# Patient Record
Sex: MALE | Race: BLACK OR AFRICAN AMERICAN | NOT HISPANIC OR LATINO | Employment: OTHER | URBAN - METROPOLITAN AREA
[De-identification: names, ages, dates, MRNs, and addresses within clinical notes are randomized per-mention and may not be internally consistent; named-entity substitution may affect disease eponyms.]

---

## 2019-03-08 ENCOUNTER — TRANSCRIBE ORDERS (OUTPATIENT)
Dept: CARDIOLOGY | Facility: CLINIC | Age: 84
End: 2019-03-08

## 2019-03-08 ENCOUNTER — OFFICE VISIT (OUTPATIENT)
Dept: CARDIOLOGY | Facility: CLINIC | Age: 84
End: 2019-03-08

## 2019-03-08 ENCOUNTER — LAB (OUTPATIENT)
Dept: LAB | Facility: HOSPITAL | Age: 84
End: 2019-03-08

## 2019-03-08 VITALS
BODY MASS INDEX: 28.63 KG/M2 | HEIGHT: 73 IN | WEIGHT: 216 LBS | DIASTOLIC BLOOD PRESSURE: 80 MMHG | HEART RATE: 83 BPM | SYSTOLIC BLOOD PRESSURE: 132 MMHG

## 2019-03-08 DIAGNOSIS — I25.5 ISCHEMIC CARDIOMYOPATHY: ICD-10-CM

## 2019-03-08 DIAGNOSIS — Z13.6 SCREENING FOR CARDIOVASCULAR CONDITION: Primary | ICD-10-CM

## 2019-03-08 DIAGNOSIS — Z01.810 PREPROCEDURAL CARDIOVASCULAR EXAMINATION: ICD-10-CM

## 2019-03-08 DIAGNOSIS — I25.5 ISCHEMIC CARDIOMYOPATHY: Primary | ICD-10-CM

## 2019-03-08 DIAGNOSIS — Z13.6 SCREENING FOR CARDIOVASCULAR CONDITION: ICD-10-CM

## 2019-03-08 LAB
ANION GAP SERPL CALCULATED.3IONS-SCNC: 12.9 MMOL/L
BASOPHILS # BLD AUTO: 0.02 10*3/MM3 (ref 0–0.2)
BASOPHILS NFR BLD AUTO: 0.3 % (ref 0–1.5)
BUN BLD-MCNC: 19 MG/DL (ref 8–23)
BUN/CREAT SERPL: 15.2 (ref 7–25)
CALCIUM SPEC-SCNC: 10.2 MG/DL (ref 8.6–10.5)
CHLORIDE SERPL-SCNC: 106 MMOL/L (ref 98–107)
CO2 SERPL-SCNC: 24.1 MMOL/L (ref 22–29)
CREAT BLD-MCNC: 1.25 MG/DL (ref 0.76–1.27)
DEPRECATED RDW RBC AUTO: 50.4 FL (ref 37–54)
EOSINOPHIL # BLD AUTO: 0.1 10*3/MM3 (ref 0–0.4)
EOSINOPHIL NFR BLD AUTO: 1.7 % (ref 0.3–6.2)
ERYTHROCYTE [DISTWIDTH] IN BLOOD BY AUTOMATED COUNT: 15.3 % (ref 12.3–15.4)
GFR SERPL CREATININE-BSD FRML MDRD: 67 ML/MIN/1.73
GLUCOSE BLD-MCNC: 107 MG/DL (ref 65–99)
HCT VFR BLD AUTO: 49.8 % (ref 37.5–51)
HGB BLD-MCNC: 16.2 G/DL (ref 13–17.7)
IMM GRANULOCYTES # BLD AUTO: 0.01 10*3/MM3 (ref 0–0.05)
IMM GRANULOCYTES NFR BLD AUTO: 0.2 % (ref 0–0.5)
LYMPHOCYTES # BLD AUTO: 1.65 10*3/MM3 (ref 0.7–3.1)
LYMPHOCYTES NFR BLD AUTO: 28.3 % (ref 19.6–45.3)
MCH RBC QN AUTO: 29.2 PG (ref 26.6–33)
MCHC RBC AUTO-ENTMCNC: 32.5 G/DL (ref 31.5–35.7)
MCV RBC AUTO: 89.9 FL (ref 79–97)
MONOCYTES # BLD AUTO: 0.71 10*3/MM3 (ref 0.1–0.9)
MONOCYTES NFR BLD AUTO: 12.2 % (ref 5–12)
NEUTROPHILS # BLD AUTO: 3.34 10*3/MM3 (ref 1.4–7)
NEUTROPHILS NFR BLD AUTO: 57.3 % (ref 42.7–76)
NRBC BLD AUTO-RTO: 0 /100 WBC (ref 0–0)
PLATELET # BLD AUTO: 191 10*3/MM3 (ref 140–450)
PMV BLD AUTO: 10.6 FL (ref 6–12)
POTASSIUM BLD-SCNC: 4.1 MMOL/L (ref 3.5–5.2)
RBC # BLD AUTO: 5.54 10*6/MM3 (ref 4.14–5.8)
SODIUM BLD-SCNC: 143 MMOL/L (ref 136–145)
WBC NRBC COR # BLD: 5.83 10*3/MM3 (ref 3.4–10.8)

## 2019-03-08 PROCEDURE — 99204 OFFICE O/P NEW MOD 45 MIN: CPT | Performed by: INTERNAL MEDICINE

## 2019-03-08 PROCEDURE — 85025 COMPLETE CBC W/AUTO DIFF WBC: CPT

## 2019-03-08 PROCEDURE — 93000 ELECTROCARDIOGRAM COMPLETE: CPT | Performed by: INTERNAL MEDICINE

## 2019-03-08 PROCEDURE — 80048 BASIC METABOLIC PNL TOTAL CA: CPT

## 2019-03-08 PROCEDURE — 36415 COLL VENOUS BLD VENIPUNCTURE: CPT

## 2019-03-08 RX ORDER — METOPROLOL SUCCINATE 25 MG/1
12.5 TABLET, EXTENDED RELEASE ORAL DAILY
Qty: 90 TABLET | Refills: 3 | Status: SHIPPED | OUTPATIENT
Start: 2019-03-08 | End: 2020-01-14

## 2019-03-08 RX ORDER — COLCHICINE 0.6 MG/1
0.6 TABLET ORAL DAILY
COMMUNITY
End: 2019-03-13 | Stop reason: HOSPADM

## 2019-03-08 RX ORDER — SERTRALINE HYDROCHLORIDE 25 MG/1
25 TABLET, FILM COATED ORAL DAILY
Refills: 6 | COMMUNITY
Start: 2019-02-25

## 2019-03-08 NOTE — PROGRESS NOTES
Subjective:     Encounter Date:03/08/2019      Patient ID: Cookie Larsen is a 83 y.o. male.    Chief Complaint:  83-year-old man with a past medical history of hypertension, deafness with cochlear implants, vitamin D deficiency who presented to his primary care physician with complaints of 6 months of dyspnea on exertion, and some chest pain, particularly while climbing stairs.  His wife states that he has had the symptoms for about 6 months and they have been progressively worsening.  He notes some heaviness in the chest and dyspnea walking on flat surfaces for a long time and with climbing the stairs in his house.  He has no presyncope or syncope.  He denies any history of arrhythmias or cardiac testing.  He underwent stress testing at [place] which showed a moderate area of anterior ischemia.  Additionally an echocardiogram showed mildly reduced systolic function with an EF of 47%  He is a former smoker.  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  He has a cochlear device that helps him here otherwise he is almost deaf.  He has 5 children and is accompanied by his wife they are so cute.        The following portions of the patient's history were reviewed and updated as appropriate: allergies, current medications, past family history, past medical history, past social history, past surgical history and problem list.    Past Medical History:   Diagnosis Date   • GONZALES (dyspnea on exertion)    • Glaucoma    • Gout    • Hypertension    • IFG (impaired fasting glucose)    • Vitamin D deficiency        No family history on file.    Social History     Socioeconomic History   • Marital status:      Spouse name: Not on file   • Number of children: Not on file   • Years of education: Not on file   • Highest education level: Not on file   Tobacco Use   • Smoking status: Never Smoker   • Smokeless tobacco: Never Used   Substance and Sexual Activity   • Alcohol use: Yes     Comment: one daily    • Drug  use: No       No Known Allergies    No past surgical history on file.    Review of Systems   Cardiovascular: Positive for chest pain and dyspnea on exertion. Negative for irregular heartbeat, leg swelling, near-syncope, orthopnea, palpitations, paroxysmal nocturnal dyspnea and syncope.   Respiratory: Positive for shortness of breath, sleep disturbances due to breathing and snoring.    Neurological: Positive for excessive daytime sleepiness. Negative for dizziness.         ECG 12 Lead  Date/Time: 3/8/2019 12:31 PM  Performed by: Jovita Crews MD  Authorized by: Jovita Crews MD   Previous ECG: no previous ECG available  Rhythm: sinus rhythm  Ectopy: atrial premature contractions  Rate: normal  QRS axis: normal  Other findings: left ventricular hypertrophy with strain    Clinical impression: abnormal EKG               Objective:     Physical Exam  GEN: no distress, alert and oriented  Lungs CTAB, no rales or wheezes  Chest: no abnormalities  Heart: RRR, no murmurs  Abdo: soft,  Nontender, nondistended  Extr: no edema, +2 DP and 2+ carotid pulses b/l  Skin: no rash or bruising  Psych: organized thought, normal behavior and affect    Lab Review:         Assessment:          Diagnosis Plan   1. Ischemic cardiomyopathy  Case Request Cath Lab: Coronary angiography, Left heart catheterization, Left ventricular angiography          Plan:       This is a very sweet 83-year-old man who presents for evaluation of new cardiac myopathy as well as abnormal nuclear stress test.  I advised him that I think he needs a left heart catheterization as I am concerned he has an LAD lesion.  He and his wife appear to understand the risks and benefits that we discussed including bleeding, heart attack stroke arrhythmia and death.  I will start metoprolol metoprolol 12.5 mg daily and will see him next week for his procedure.     Dr. Hogan, thank you for referring this kind patient to me.    Jovita Crews MD  03/08/19

## 2019-03-08 NOTE — H&P (VIEW-ONLY)
Subjective:     Encounter Date:03/08/2019      Patient ID: Cookie Larsen is a 83 y.o. male.    Chief Complaint:  83-year-old man with a past medical history of hypertension, deafness with cochlear implants, vitamin D deficiency who presented to his primary care physician with complaints of 6 months of dyspnea on exertion, and some chest pain, particularly while climbing stairs.  His wife states that he has had the symptoms for about 6 months and they have been progressively worsening.  He notes some heaviness in the chest and dyspnea walking on flat surfaces for a long time and with climbing the stairs in his house.  He has no presyncope or syncope.  He denies any history of arrhythmias or cardiac testing.  He underwent stress testing at [place] which showed a moderate area of anterior ischemia.  Additionally an echocardiogram showed mildly reduced systolic function with an EF of 47%  He is a former smoker.  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  He has a cochlear device that helps him here otherwise he is almost deaf.  He has 5 children and is accompanied by his wife they are so cute.        The following portions of the patient's history were reviewed and updated as appropriate: allergies, current medications, past family history, past medical history, past social history, past surgical history and problem list.    Past Medical History:   Diagnosis Date   • GONZALES (dyspnea on exertion)    • Glaucoma    • Gout    • Hypertension    • IFG (impaired fasting glucose)    • Vitamin D deficiency        No family history on file.    Social History     Socioeconomic History   • Marital status:      Spouse name: Not on file   • Number of children: Not on file   • Years of education: Not on file   • Highest education level: Not on file   Tobacco Use   • Smoking status: Never Smoker   • Smokeless tobacco: Never Used   Substance and Sexual Activity   • Alcohol use: Yes     Comment: one daily    • Drug  use: No       No Known Allergies    No past surgical history on file.    Review of Systems   Cardiovascular: Positive for chest pain and dyspnea on exertion. Negative for irregular heartbeat, leg swelling, near-syncope, orthopnea, palpitations, paroxysmal nocturnal dyspnea and syncope.   Respiratory: Positive for shortness of breath, sleep disturbances due to breathing and snoring.    Neurological: Positive for excessive daytime sleepiness. Negative for dizziness.         ECG 12 Lead  Date/Time: 3/8/2019 12:31 PM  Performed by: Jovita Crews MD  Authorized by: Jovita Crews MD   Previous ECG: no previous ECG available  Rhythm: sinus rhythm  Ectopy: atrial premature contractions  Rate: normal  QRS axis: normal  Other findings: left ventricular hypertrophy with strain    Clinical impression: abnormal EKG               Objective:     Physical Exam  GEN: no distress, alert and oriented  Lungs CTAB, no rales or wheezes  Chest: no abnormalities  Heart: RRR, no murmurs  Abdo: soft,  Nontender, nondistended  Extr: no edema, +2 DP and 2+ carotid pulses b/l  Skin: no rash or bruising  Psych: organized thought, normal behavior and affect    Lab Review:         Assessment:          Diagnosis Plan   1. Ischemic cardiomyopathy  Case Request Cath Lab: Coronary angiography, Left heart catheterization, Left ventricular angiography          Plan:       This is a very sweet 83-year-old man who presents for evaluation of new cardiac myopathy as well as abnormal nuclear stress test.  I advised him that I think he needs a left heart catheterization as I am concerned he has an LAD lesion.  He and his wife appear to understand the risks and benefits that we discussed including bleeding, heart attack stroke arrhythmia and death.  I will start metoprolol metoprolol 12.5 mg daily and will see him next week for his procedure.     Dr. Hogan, thank you for referring this kind patient to me.    Jovita Crews MD  03/08/19

## 2019-03-11 PROBLEM — I25.5 ISCHEMIC CARDIOMYOPATHY: Status: ACTIVE | Noted: 2019-03-11

## 2019-03-13 ENCOUNTER — HOSPITAL ENCOUNTER (OUTPATIENT)
Facility: HOSPITAL | Age: 84
Setting detail: HOSPITAL OUTPATIENT SURGERY
Discharge: HOME | End: 2019-03-13
Attending: INTERNAL MEDICINE | Admitting: INTERNAL MEDICINE

## 2019-03-13 VITALS
DIASTOLIC BLOOD PRESSURE: 84 MMHG | SYSTOLIC BLOOD PRESSURE: 134 MMHG | HEART RATE: 77 BPM | BODY MASS INDEX: 28.89 KG/M2 | WEIGHT: 218 LBS | RESPIRATION RATE: 18 BRPM | TEMPERATURE: 97.7 F | HEIGHT: 73 IN | OXYGEN SATURATION: 97 %

## 2019-03-13 DIAGNOSIS — I25.5 ISCHEMIC CARDIOMYOPATHY: ICD-10-CM

## 2019-03-13 PROCEDURE — 93458 L HRT ARTERY/VENTRICLE ANGIO: CPT | Performed by: INTERNAL MEDICINE

## 2019-03-13 PROCEDURE — C1769 GUIDE WIRE: HCPCS | Performed by: INTERNAL MEDICINE

## 2019-03-13 PROCEDURE — 99152 MOD SED SAME PHYS/QHP 5/>YRS: CPT | Performed by: INTERNAL MEDICINE

## 2019-03-13 PROCEDURE — 99153 MOD SED SAME PHYS/QHP EA: CPT | Performed by: INTERNAL MEDICINE

## 2019-03-13 PROCEDURE — 25010000002 HEPARIN (PORCINE) PER 1000 UNITS: Performed by: INTERNAL MEDICINE

## 2019-03-13 PROCEDURE — 25010000002 FENTANYL CITRATE (PF) 100 MCG/2ML SOLUTION: Performed by: INTERNAL MEDICINE

## 2019-03-13 PROCEDURE — 0 IOPAMIDOL PER 1 ML: Performed by: INTERNAL MEDICINE

## 2019-03-13 PROCEDURE — C1894 INTRO/SHEATH, NON-LASER: HCPCS | Performed by: INTERNAL MEDICINE

## 2019-03-13 PROCEDURE — 25010000002 MIDAZOLAM PER 1 MG: Performed by: INTERNAL MEDICINE

## 2019-03-13 RX ORDER — MIDAZOLAM HYDROCHLORIDE 1 MG/ML
INJECTION INTRAMUSCULAR; INTRAVENOUS AS NEEDED
Status: DISCONTINUED | OUTPATIENT
Start: 2019-03-13 | End: 2019-03-13 | Stop reason: HOSPADM

## 2019-03-13 RX ORDER — SODIUM CHLORIDE 9 MG/ML
100 INJECTION, SOLUTION INTRAVENOUS CONTINUOUS
Status: DISCONTINUED | OUTPATIENT
Start: 2019-03-13 | End: 2019-03-13 | Stop reason: HOSPADM

## 2019-03-13 RX ORDER — FENTANYL CITRATE 50 UG/ML
INJECTION, SOLUTION INTRAMUSCULAR; INTRAVENOUS AS NEEDED
Status: DISCONTINUED | OUTPATIENT
Start: 2019-03-13 | End: 2019-03-13 | Stop reason: HOSPADM

## 2019-03-13 RX ORDER — SODIUM CHLORIDE 0.9 % (FLUSH) 0.9 %
3-10 SYRINGE (ML) INJECTION AS NEEDED
Status: DISCONTINUED | OUTPATIENT
Start: 2019-03-13 | End: 2019-03-13 | Stop reason: HOSPADM

## 2019-03-13 RX ORDER — LIDOCAINE HYDROCHLORIDE 10 MG/ML
0.1 INJECTION, SOLUTION EPIDURAL; INFILTRATION; INTRACAUDAL; PERINEURAL ONCE AS NEEDED
Status: DISCONTINUED | OUTPATIENT
Start: 2019-03-13 | End: 2019-03-13 | Stop reason: HOSPADM

## 2019-03-13 RX ORDER — LIDOCAINE HYDROCHLORIDE 20 MG/ML
INJECTION, SOLUTION INFILTRATION; PERINEURAL AS NEEDED
Status: DISCONTINUED | OUTPATIENT
Start: 2019-03-13 | End: 2019-03-13 | Stop reason: HOSPADM

## 2019-03-13 RX ORDER — SODIUM CHLORIDE 0.9 % (FLUSH) 0.9 %
3 SYRINGE (ML) INJECTION EVERY 12 HOURS SCHEDULED
Status: DISCONTINUED | OUTPATIENT
Start: 2019-03-13 | End: 2019-03-13 | Stop reason: HOSPADM

## 2019-03-13 RX ORDER — SODIUM CHLORIDE 9 MG/ML
75 INJECTION, SOLUTION INTRAVENOUS CONTINUOUS
Status: DISCONTINUED | OUTPATIENT
Start: 2019-03-13 | End: 2019-03-13 | Stop reason: HOSPADM

## 2019-03-13 RX ADMIN — SODIUM CHLORIDE 75 ML/HR: 9 INJECTION, SOLUTION INTRAVENOUS at 09:18

## 2019-03-13 NOTE — DISCHARGE INSTRUCTIONS
[facility]  [address] [place] [address]    Coronary Angiogram (Radial/Ulnar Approach) After Care    Refer to this sheet in the next few weeks. These instructions provide you with information on caring for yourself after your procedure. Your caregiver may also give you more specific instructions. Your treatment has been planned according to current medical practices, but problems sometimes occur. Call your caregiver if you have any problems or questions after your procedure.    Home Care Instructions:  · You may shower the day after the procedure. Remove the bandage (dressing) and gently wash the site with plain soap and water. Gently pat the site dry. You may apply a band aid daily for 2 days if desired.    · Do not apply powder or lotion to the site.  · Do not submerge the affected site in water for 3 to 5 days or until the site is completely healed.   · Do not lift, push or pull anything over 10 pounds for 2 days after your procedure.  · Inspect the site at least twice daily. You may notice some bruising at the site and it may be tender for 1 to 2 weeks.     · Increase your fluid intake for the next 2 days.    · Keep arm elevated for 24 hours. For the remainder of the day, keep your arm in “Pledge of Allegiance” position when up and about.     · You may drive 24 hours after the procedure unless otherwise instructed by your caregiver.  · Do not operate machinery or power tools for 24 hours.  · A responsible adult should be with you for the first 24 hours after you arrive home. Do not make any important legal decisions or sign legal papers for 24 hours.  Do not drink alcohol for 24 hours.    · Metformin or any medications containing Metformin should not be taken for 48 hours after your procedure.      Call Your Doctor if:   · You have unusual pain at the radial/ulnar (wrist) site.  · You have redness, warmth, swelling, or pain at the radial/ulnar (wrist) site.  · You have drainage (other  than a small amount of blood on the dressing).  · You have chills or a fever > 101.  · Your arm becomes pale or dark, cool, tingly, or numb.  · You have heavy bleeding from the site, hold pressure on the site for 20 minutes.  If the bleeding stops, apply a fresh bandage and call your cardiologist.  However, if you continue to have bleeding, call 911.                Moderate Conscious Sedation, Adult, Care After  Refer to this sheet in the next few weeks. These instructions provide you with information on caring for yourself after your procedure. Your health care provider may also give you more specific instructions. Your treatment has been planned according to current medical practices, but problems sometimes occur. Call your health care provider if you have any problems or questions after your procedure.  WHAT TO EXPECT AFTER THE PROCEDURE   After your procedure:  · You may feel sleepy, clumsy, and have poor balance for several hours.  · Vomiting may occur if you eat too soon after the procedure.  HOME CARE INSTRUCTIONS  · Do not participate in any activities where you could become injured for at least 24 hours. Do not:    Drive.    Swim.    Ride a bicycle.    Operate heavy machinery.    Cook.    Use power tools.    Climb ladders.    Work from a high place.  · Do not make important decisions or sign legal documents until you are improved.  · If you vomit, drink water, juice, or soup when you can drink without vomiting. Make sure you have little or no nausea before eating solid foods.  · Only take over-the-counter or prescription medicines for pain, discomfort, or fever as directed by your health care provider.  · Make sure you and your family fully understand everything about the medicines given to you, including what side effects may occur.  · You should not drink alcohol, take sleeping pills, or take medicines that cause drowsiness for at least 24 hours.  · If you smoke, do not smoke without supervision.  · If  you are feeling better, you may resume normal activities 24 hours after you were sedated.  · Keep all appointments with your health care provider.  · You should have a responsible adult stay with you for the first 24 hours post procedure.  SEEK MEDICAL CARE IF:  · Your skin is pale or bluish in color.  · You continue to feel nauseous or vomit.  · Your pain is getting worse and is not helped by medicine.  · You have bleeding or swelling.  · You are still sleepy or feeling clumsy after 24 hours.  SEEK IMMEDIATE MEDICAL CARE IF:  · You develop a rash.  · You have difficulty breathing.  · You develop any type of allergic problem.  · You have a fever.  MAKE SURE YOU:  · Understand these instructions.  · Will watch your condition.  · Will get help right away if you are not doing well or get worse.     This information is not intended to replace advice given to you by your health care provider. Make sure you discuss any questions you have with your health care provider.     Document Released: 10/08/2014 Document Revised: 01/08/2016 Document Reviewed: 10/08/2014  [employer] Interactive Patient Education ©2016 [employer]

## 2019-04-01 ENCOUNTER — OFFICE VISIT (OUTPATIENT)
Dept: CARDIOLOGY | Facility: CLINIC | Age: 84
End: 2019-04-01

## 2019-04-01 VITALS
WEIGHT: 216 LBS | DIASTOLIC BLOOD PRESSURE: 82 MMHG | SYSTOLIC BLOOD PRESSURE: 134 MMHG | HEART RATE: 70 BPM | BODY MASS INDEX: 28.63 KG/M2 | HEIGHT: 73 IN

## 2019-04-01 DIAGNOSIS — I25.5 ISCHEMIC CARDIOMYOPATHY: Primary | ICD-10-CM

## 2019-04-01 PROCEDURE — 99213 OFFICE O/P EST LOW 20 MIN: CPT | Performed by: INTERNAL MEDICINE

## 2019-04-01 PROCEDURE — 93000 ELECTROCARDIOGRAM COMPLETE: CPT | Performed by: INTERNAL MEDICINE

## 2019-04-01 RX ORDER — ROSUVASTATIN CALCIUM 5 MG/1
TABLET, COATED ORAL
COMMUNITY
End: 2020-07-21

## 2019-04-01 NOTE — PROGRESS NOTES
Subjective:     Encounter Date: 04/01/19        Patient ID: Cookie Larsen is a 83 y.o. male.    Chief Complaint:  83-year-old man with a past medical history of hypertension, deafness with cochlear implants, vitamin D deficiency who originally presented with a complaint of 6 months of dyspnea on exertion and intermittent chest pain with climbing stairs.  He underwent sstress testing at [place] which showed a moderate area of anterior ischemia.  Additionally an echocardiogram showed mildly reduced systolic function with an EF of 47%  He is a former smoker.  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  He has a cochlear device that helps him here otherwise he is almost deaf. He has 5 children and is accompanied by his wife they have been  since 1962.   He underwent left heart catheterization with me on March 11, 2019.  He had normal LV filling pressures, EF of 40-45%.  Coronary angiogram showed mild disease in the LAD with a diffusely diseased small caliber first diagonal.  The circumflex and RCA are normal.      Cardiomyopathy   Pertinent negatives include no chest pain.       The following portions of the patient's history were reviewed and updated as appropriate: allergies, current medications, past family history, past medical history, past social history, past surgical history and problem list.    Past Medical History:   Diagnosis Date   • GONZALES (dyspnea on exertion)    • Glaucoma    • Gout    • Hypertension    • IFG (impaired fasting glucose)    • Vitamin D deficiency        No family history on file.    Social History     Socioeconomic History   • Marital status:      Spouse name: Not on file   • Number of children: Not on file   • Years of education: Not on file   • Highest education level: Not on file   Tobacco Use   • Smoking status: Never Smoker   • Smokeless tobacco: Never Used   Substance and Sexual Activity   • Alcohol use: Yes     Comment: 1-2 daily    • Drug use: No   • Sexual  activity: Defer       No Known Allergies    Past Surgical History:   Procedure Laterality Date   • CARDIAC CATHETERIZATION N/A 3/13/2019    Procedure: Coronary angiography;  Surgeon: Jovita Crews MD;  Location: [facility] CATH INVASIVE LOCATION;  Service: Cardiology   • CARDIAC CATHETERIZATION N/A 3/13/2019    Procedure: Left Heart Cath;  Surgeon: Jovita Crews MD;  Location: [facility] CATH INVASIVE LOCATION;  Service: Cardiology   • CARDIAC CATHETERIZATION N/A 3/13/2019    Procedure: Left ventriculography;  Surgeon: Jovita Crews MD;  Location: [facility] CATH INVASIVE LOCATION;  Service: Cardiology   • EAR MASTOIDECTOMY W/ COCHLEAR IMPLANT W/ LANDMARK Right    • HEMORRHOIDECTOMY         Review of Systems   Cardiovascular: Positive for dyspnea on exertion. Negative for chest pain, irregular heartbeat, leg swelling, near-syncope, palpitations, paroxysmal nocturnal dyspnea and syncope.   Respiratory: Positive for sleep disturbances due to breathing and snoring.    Neurological: Positive for excessive daytime sleepiness. Negative for dizziness.         ECG 12 Lead  Date/Time: 4/1/2019 12:53 PM  Performed by: Jovita Crews MD  Authorized by: Jovita Crews MD                  Objective:     Physical Exam  GEN: no distress, alert and oriented  Lungs CTAB, no rales or wheezes  Chest: no abnormalities  Heart: RRR, no murmurs  Abdo: soft,  Nontender, nondistended  Extr: no edema, +2 DP and 2+ carotid pulses b/l  Skin: no rash or bruising  Psych: organized thought, normal behavior and affect    Lab Review:         Assessment:          Diagnosis Plan   1. Ischemic cardiomyopathy            Plan:         Heart Failure  Assessment  • NYHA class II - There is slight limitation of physical activity. The patient is comfortable at rest, but physical activity results in fatigue, palpitations or shortness of breath.  • Left ventricular function is mildly reduced by qualitative assessment  • Increase metoprolol from 12.5 to 25 daily- well  tolerated, continue irbesartan 150.  No clinical signs of volume overload- will hold off on diuretic        Follow up in 3 months     Dr. Hogan, thank you for referring this kind patient to me.    Jovita Crews MD  04/01/19

## 2019-07-09 ENCOUNTER — TELEPHONE (OUTPATIENT)
Dept: CARDIAC REHAB | Facility: HOSPITAL | Age: 84
End: 2019-07-09

## 2019-07-09 ENCOUNTER — OFFICE VISIT (OUTPATIENT)
Dept: CARDIOLOGY | Facility: CLINIC | Age: 84
End: 2019-07-09

## 2019-07-09 VITALS
BODY MASS INDEX: 29.03 KG/M2 | DIASTOLIC BLOOD PRESSURE: 80 MMHG | WEIGHT: 219 LBS | SYSTOLIC BLOOD PRESSURE: 136 MMHG | HEIGHT: 73 IN | HEART RATE: 78 BPM

## 2019-07-09 DIAGNOSIS — I42.9 CARDIOMYOPATHY, UNSPECIFIED TYPE (HCC): Primary | ICD-10-CM

## 2019-07-09 PROCEDURE — 93000 ELECTROCARDIOGRAM COMPLETE: CPT | Performed by: INTERNAL MEDICINE

## 2019-07-09 PROCEDURE — 99213 OFFICE O/P EST LOW 20 MIN: CPT | Performed by: INTERNAL MEDICINE

## 2019-07-09 NOTE — PROGRESS NOTES
Subjective:     Encounter Date: 07/09/19        Patient ID: Cookie Larsen is a 84 y.o. male.    Chief Complaint:  83-year-old man with a past medical history of hypertension, deafness with cochlear implants, vitamin D deficiency who originally presented with a complaint of 6 months of dyspnea on exertion and intermittent chest pain with climbing stairs.  He underwent sstress testing at [place] which showed a moderate area of anterior ischemia.  Additionally an echocardiogram showed mildly reduced systolic function with an EF of 47%  He is a former smoker.  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  He has a cochlear device that helps him here otherwise he is almost deaf. He has 5 children and is accompanied by his wife they have been  since 1962.   He underwent left heart catheterization with me on March 11, 2019.  He had normal LV filling pressures, EF of 40-45%.  Coronary angiogram showed mild disease in the LAD with a diffusely diseased small caliber first diagonal.  The circumflex and RCA are normal.    Today he returns for follow up. He did not bring a medication list. He states he's taking his medications as I have prescribed. He is very concerned about his neighbors, who may be running a drug ring?, and therefore he is not walking for exercise and stays mostly confined at home. He denies chest pain or dyspnea, but does continue to feel weak and fatigued.       Cardiomyopathy   Associated symptoms include coughing. Pertinent negatives include no chest pain.       The following portions of the patient's history were reviewed and updated as appropriate: allergies, current medications, past family history, past medical history, past social history, past surgical history and problem list.    Past Medical History:   Diagnosis Date   • GONZALES (dyspnea on exertion)    • Glaucoma    • Gout    • Hypertension    • IFG (impaired fasting glucose)    • Vitamin D deficiency        No family history on  file.    Social History     Socioeconomic History   • Marital status:      Spouse name: Not on file   • Number of children: Not on file   • Years of education: Not on file   • Highest education level: Not on file   Tobacco Use   • Smoking status: Never Smoker   • Smokeless tobacco: Never Used   Substance and Sexual Activity   • Alcohol use: Yes     Comment: 1-2 daily    • Drug use: No   • Sexual activity: Defer       No Known Allergies    Past Surgical History:   Procedure Laterality Date   • CARDIAC CATHETERIZATION N/A 3/13/2019    Procedure: Coronary angiography;  Surgeon: Jovita Crews MD;  Location:  [facility] CATH INVASIVE LOCATION;  Service: Cardiology   • CARDIAC CATHETERIZATION N/A 3/13/2019    Procedure: Left Heart Cath;  Surgeon: Jovita Crews MD;  Location:  [facility] CATH INVASIVE LOCATION;  Service: Cardiology   • CARDIAC CATHETERIZATION N/A 3/13/2019    Procedure: Left ventriculography;  Surgeon: Jovita Crews MD;  Location:  [facility] CATH INVASIVE LOCATION;  Service: Cardiology   • EAR MASTOIDECTOMY W/ COCHLEAR IMPLANT W/ LANDMARK Right    • HEMORRHOIDECTOMY         Review of Systems   Constitution: Negative.   HENT: Positive for ear pain.    Eyes: Positive for blurred vision.   Cardiovascular: Negative.  Negative for chest pain, irregular heartbeat, leg swelling, near-syncope, palpitations, paroxysmal nocturnal dyspnea and syncope.   Respiratory: Positive for cough.    Endocrine: Negative.    Skin: Negative.    Musculoskeletal: Negative.    Gastrointestinal: Negative.    Genitourinary: Negative.    Neurological: Positive for excessive daytime sleepiness. Negative for dizziness.   Psychiatric/Behavioral: Negative.          ECG 12 Lead  Date/Time: 7/9/2019 12:45 PM  Performed by: Jovita Crews MD  Authorized by: Jovita Crews MD   Comparison: compared with previous ECG from 3/8/2019  Similar to previous ECG  Rhythm: sinus rhythm  Rate: normal  Conduction: conduction normal  ST Segments: ST segments  normal  T Waves: T waves normal  QRS axis: normal  Other findings: left ventricular hypertrophy    Clinical impression: non-specific ECG          Outpatient Encounter Medications as of 7/9/2019   Medication Sig Dispense Refill   • allopurinol (ZYLOPRIM) 300 MG tablet Take 300 mg by mouth Daily.     • aspirin 81 MG EC tablet Take 81 mg by mouth Daily.     • brimonidine (ALPHAGAN) 0.2 % ophthalmic solution Administer 1 drop to both eyes 3 (Three) Times a Day.     • fluticasone-salmeterol (ADVAIR HFA) 115-21 MCG/ACT inhaler Inhale 2 puffs 2 (Two) Times a Day.     • irbesartan (AVAPRO) 150 MG tablet Take 150 mg by mouth Daily.     • latanoprost (XALATAN) 0.005 % ophthalmic solution Administer 1 drop to both eyes Every Night.     • metoprolol succinate XL (TOPROL-XL) 25 MG 24 hr tablet Take 0.5 tablets by mouth Daily. 90 tablet 3   • rosuvastatin (CRESTOR) 5 MG tablet rosuvastatin 5 mg tablet   TAKE 1 TABLET BY MOUTH EVERY DAY     • sertraline (ZOLOFT) 25 MG tablet Take 25 mg by mouth Daily.  6     No facility-administered encounter medications on file as of 7/9/2019.           Objective:     Physical Exam  GEN: no distress, alert and oriented. [place].   Lungs CTAB, no rales or wheezes  Chest: no abnormalities  Heart: RRR, no murmurs  Abdo: soft,  Nontender, nondistended  Extr: no edema, +2 DP and 2+ carotid pulses b/l  Skin: no rash or bruising  Psych: organized thought, normal behavior and affect    Lab Review:         Assessment:         Nonischemic cardiomyopathy    Plan:         Heart Failure  Assessment  • NYHA class II - There is slight limitation of physical activity. The patient is comfortable at rest, but physical activity results in fatigue, palpitations or shortness of breath.  • ACE inhibitor prescribed  • Beta blocker prescribed  • Diuretics not prescribed  • Left ventricular function is mildly reduced by qualitative assessment  • Metoprolol 25mg BID, Irbesartan 150. I am unclear whether he is actually taking  these doses. He will bring his medications to the next visit.   Will enroll in cardiac rehab for chronic stable CHF with NYHA III symptoms as he has not been hospitalized in over 6 weeks.  He is weak and fatigued and needs to exercise.   Follow up with me in 4-6 months.              Dr. Hogan, thank you for referring this kind patient to me. Overall I think he is doing ok. His symptoms are fairly nonspecific, and he appears to be well compensated on exam. I hope cardiac rehab will improve his stamina.     Jovita Crews MD  07/09/19

## 2019-07-09 NOTE — TELEPHONE ENCOUNTER
Received cardiac rehab referral from Dr. Crews.  Pt's EMR reviewed.  He would not qualify for phase II cardiac under Medicare guidelines for heart failure diagnosis.  Pt's EF needs to be 35% or less. Also noted pt lives closer to other cardiac rehab programs.  Left VM for pt to return my call.  He could possibly attend a phase III program.

## 2019-07-25 ENCOUNTER — TELEPHONE (OUTPATIENT)
Dept: CARDIAC REHAB | Facility: HOSPITAL | Age: 84
End: 2019-07-25

## 2019-07-25 NOTE — TELEPHONE ENCOUNTER
Called pt secondary to referral for cardiac rehab from Dr. VIKRAM Sotelo.  Spoke to wife as pt was asleep.  Explained program and told wife there is a closer program on [facility] through [facility].  They live on [address] [place] [address] so this would be more convenient.  Provided address and telephone number to that program.

## 2020-01-14 ENCOUNTER — OFFICE VISIT (OUTPATIENT)
Dept: CARDIOLOGY | Facility: CLINIC | Age: 85
End: 2020-01-14

## 2020-01-14 VITALS
BODY MASS INDEX: 28.89 KG/M2 | WEIGHT: 218 LBS | DIASTOLIC BLOOD PRESSURE: 80 MMHG | HEIGHT: 73 IN | HEART RATE: 78 BPM | SYSTOLIC BLOOD PRESSURE: 130 MMHG

## 2020-01-14 DIAGNOSIS — E78.2 MIXED HYPERLIPIDEMIA: ICD-10-CM

## 2020-01-14 DIAGNOSIS — I50.30 HEART FAILURE WITH PRESERVED EJECTION FRACTION, UNSPECIFIED HF CHRONICITY (HCC): Primary | ICD-10-CM

## 2020-01-14 PROCEDURE — 93000 ELECTROCARDIOGRAM COMPLETE: CPT | Performed by: INTERNAL MEDICINE

## 2020-01-14 PROCEDURE — 99213 OFFICE O/P EST LOW 20 MIN: CPT | Performed by: INTERNAL MEDICINE

## 2020-01-14 NOTE — PROGRESS NOTES
"    Subjective:     Encounter Date: 01/14/20        Patient ID: Cookie Larsen is a 84 y.o. male.    Chief Complaint: CHF, CAD  HPI:   84-year-old man with a past medical history of hypertension, deafness with cochlear implants, vitamin D deficiency who originally presented with a complaint of 6 months of dyspnea on exertion and intermittent chest pain with climbing stairs.  He underwent sstress testing at [place] which showed a moderate area of anterior ischemia.  Additionally an echocardiogram showed mildly reduced systolic function with an EF of 47%  He is a former smoker.  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  He has a cochlear device that helps him here otherwise he is almost deaf. He has 5 children and is accompanied by his wife they have been  since 1962.   He underwent left heart catheterization with me on March 11, 2019.  He had normal LV filling pressures, EF of 40-45%.  Coronary angiogram showed mild disease in the LAD with a diffusely diseased small caliber first diagonal.  The circumflex and RCA are normal.    Today he returns for follow up.  He again does not have a medication list.  He says he feels weak in the morning, but otherwise feels \"fair\".  His wife on the other hand states that he is doing quite poorly.  She says that he is weak and dizzy every day after eating breakfast.  This is about an hour after taking his morning medications.  He has to go lay down because he is dizzy.  Apparently this morning he stated that he felt like he was going to pass out.  The patient neither confirms nor did not he is this.  His blood pressure is 130/80 today, they do not measure his blood pressure regularly at home.  He is not doing much in terms of physical exercise.    REVIEW OF SYSTEMS:   All systems reviewed.  Pertinent positives identified in HPI.  All other systems are negative.    The following portions of the patient's history were reviewed and updated as appropriate: allergies, " current medications, past family history, past medical history, past social history, past surgical history and problem list.    Past Medical History:   Diagnosis Date   • GONZALES (dyspnea on exertion)    • Glaucoma    • Gout    • Hypertension    • IFG (impaired fasting glucose)    • NICM (nonischemic cardiomyopathy) (CMS/HCC)    • Vitamin D deficiency        No family history on file.    Social History     Socioeconomic History   • Marital status:      Spouse name: Not on file   • Number of children: Not on file   • Years of education: Not on file   • Highest education level: Not on file   Tobacco Use   • Smoking status: Never Smoker   • Smokeless tobacco: Never Used   Substance and Sexual Activity   • Alcohol use: Yes     Comment: 1-2 daily    • Drug use: No   • Sexual activity: Defer       No Known Allergies    Past Surgical History:   Procedure Laterality Date   • CARDIAC CATHETERIZATION N/A 3/13/2019    Procedure: Coronary angiography;  Surgeon: Jovita Crews MD;  Location:  [facility];  Service: Cardiology   • CARDIAC CATHETERIZATION N/A 3/13/2019    Procedure: Left Heart Cath;  Surgeon: Jovita Crews MD;  Location:  [facility];  Service: Cardiology   • CARDIAC CATHETERIZATION N/A 3/13/2019    Procedure: Left ventriculography;  Surgeon: Jovita Crews MD;  Location:  [facility];  Service: Cardiology   • EAR MASTOIDECTOMY W/ COCHLEAR IMPLANT W/ LANDMARK Right    • HEMORRHOIDECTOMY               ECG 12 Lead  Date/Time: 1/14/2020 10:50 AM  Performed by: Jovita Crews MD  Authorized by: Jovita Crews MD   Comparison: compared with previous ECG from 7/9/2019  Similar to previous ECG  Rhythm: sinus rhythm  Rate: normal  Conduction: conduction normal  ST Segments: ST segments normal  T Waves: T waves normal  QRS axis: normal    Clinical impression: normal ECG          Outpatient Encounter Medications as of 1/14/2020   Medication Sig Dispense Refill   •  allopurinol (ZYLOPRIM) 300 MG tablet Take 300 mg by mouth Daily.     • aspirin 81 MG EC tablet Take 81 mg by mouth Daily.     • brimonidine (ALPHAGAN) 0.2 % ophthalmic solution Administer 1 drop to both eyes 3 (Three) Times a Day.     • fluticasone-salmeterol (ADVAIR HFA) 115-21 MCG/ACT inhaler Inhale 2 puffs 2 (Two) Times a Day.     • irbesartan (AVAPRO) 150 MG tablet Take 150 mg by mouth Daily.     • latanoprost (XALATAN) 0.005 % ophthalmic solution Administer 1 drop to both eyes Every Night.     • metoprolol succinate XL (TOPROL-XL) 25 MG 24 hr tablet Take 0.5 tablets by mouth Daily. 90 tablet 3   • rosuvastatin (CRESTOR) 5 MG tablet rosuvastatin 5 mg tablet   TAKE 1 TABLET BY MOUTH EVERY DAY     • sertraline (ZOLOFT) 25 MG tablet Take 25 mg by mouth Daily.  6     No facility-administered encounter medications on file as of 1/14/2020.           Objective:     Physical Exam  GEN: no distress, alert and oriented. [place].   Lungs CTAB, no rales or wheezes  Chest: no abnormalities  Heart: RRR, no murmurs  Abdo: soft,  Nontender, nondistended  Extr: no edema, +2 DP and 2+ carotid pulses b/l  Skin: no rash or bruising  Psych: organized thought, normal behavior and affect    Outpatient Encounter Medications as of 1/14/2020   Medication Sig Dispense Refill   • allopurinol (ZYLOPRIM) 300 MG tablet Take 300 mg by mouth Daily.     • aspirin 81 MG EC tablet Take 81 mg by mouth Daily.     • brimonidine (ALPHAGAN) 0.2 % ophthalmic solution Administer 1 drop to both eyes 3 (Three) Times a Day.     • fluticasone-salmeterol (ADVAIR HFA) 115-21 MCG/ACT inhaler Inhale 2 puffs 2 (Two) Times a Day.     • irbesartan (AVAPRO) 150 MG tablet Take 150 mg by mouth Daily.     • latanoprost (XALATAN) 0.005 % ophthalmic solution Administer 1 drop to both eyes Every Night.     • rosuvastatin (CRESTOR) 5 MG tablet rosuvastatin 5 mg tablet   TAKE 1 TABLET BY MOUTH EVERY DAY     • sertraline (ZOLOFT) 25 MG tablet Take 25 mg by mouth Daily.  6   •  [DISCONTINUED] metoprolol succinate XL (TOPROL-XL) 25 MG 24 hr tablet Take 0.5 tablets by mouth Daily. 90 tablet 3     No facility-administered encounter medications on file as of 1/14/2020.              Assessment:         Nonischemic cardiomyopathy  HLD    Plan:      1. Nonischemic CM: EF 40-45%  Stop Metoprolol due to dizziness. Continue Irbesartan. Euvolemic.   2. CAD: Diagonal disease on LHC, otherwise non-obstructive CAD. Continue ASA.   3. HLD: Currently on Crestor. Given his age, I would consider stopping this at the next visit.   4. Weakness: Possibly related to relative hypotension. We will stop his toprol and see how he does.      Dr. Hogan, thank you for referring this kind patient to me. I have stopped his BB due to weakness and fatigue. I will see him in 6 months.     Jovita Crews MD  01/14/20

## 2020-07-21 ENCOUNTER — OFFICE VISIT (OUTPATIENT)
Dept: CARDIOLOGY | Facility: CLINIC | Age: 85
End: 2020-07-21

## 2020-07-21 VITALS
WEIGHT: 224.6 LBS | HEIGHT: 73 IN | HEART RATE: 89 BPM | SYSTOLIC BLOOD PRESSURE: 130 MMHG | DIASTOLIC BLOOD PRESSURE: 70 MMHG | BODY MASS INDEX: 29.77 KG/M2

## 2020-07-21 DIAGNOSIS — I42.8 NICM (NONISCHEMIC CARDIOMYOPATHY) (HCC): Primary | ICD-10-CM

## 2020-07-21 PROCEDURE — 99214 OFFICE O/P EST MOD 30 MIN: CPT | Performed by: INTERNAL MEDICINE

## 2020-07-21 PROCEDURE — 93000 ELECTROCARDIOGRAM COMPLETE: CPT | Performed by: INTERNAL MEDICINE

## 2020-07-21 NOTE — PROGRESS NOTES
Subjective:     Encounter Date: 07/21/20        Patient ID: Cookie Larsen is a 85 y.o. male.    Chief Complaint: CHF, CAD  HPI:   84-year-old man with a past medical history of nonischemic cardiomyopathy EF 40-45%, hypertension, deafness with cochlear implants, vitamin D deficiency who originally presented with a complaint of 6 months of dyspnea on exertion and intermittent chest pain with climbing stairs.  He underwent sstress testing at [place] which showed a moderate area of anterior ischemia.  He underwent left heart catheterization with me on March 11, 2019.  He had normal LV filling pressures, EF of 40-45%.  Coronary angiogram showed mild disease in the LAD with a diffusely diseased small caliber first diagonal.  The circumflex and RCA are normal.    Today he returns for follow up. He is feeling well. No complaints of angina or dizziness. He lost some friends to COVID and is upset about that. He is practicing social distancing. At the last visit we stopped Metoprolol due to dizziness and he feels better.     He is a former smoker.  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  He has a cochlear device that helps him here otherwise he is almost deaf. He has 5 children and is accompanied by his wife they have been  since 1962.     REVIEW OF SYSTEMS:   All systems reviewed.  Pertinent positives identified in HPI.  All other systems are negative.    The following portions of the patient's history were reviewed and updated as appropriate: allergies, current medications, past family history, past medical history, past social history, past surgical history and problem list.    Past Medical History:   Diagnosis Date   • GONZALES (dyspnea on exertion)    • Glaucoma    • Gout    • Hypertension    • IFG (impaired fasting glucose)    • NICM (nonischemic cardiomyopathy) (CMS/HCC)    • Vitamin D deficiency        History reviewed. No pertinent family history.    Social History     Socioeconomic History   •  Marital status:      Spouse name: Not on file   • Number of children: Not on file   • Years of education: Not on file   • Highest education level: Not on file   Tobacco Use   • Smoking status: Never Smoker   • Smokeless tobacco: Never Used   Substance and Sexual Activity   • Alcohol use: Yes     Comment: 1-2 daily    • Drug use: No   • Sexual activity: Defer       No Known Allergies    Past Surgical History:   Procedure Laterality Date   • CARDIAC CATHETERIZATION N/A 3/13/2019    Procedure: Coronary angiography;  Surgeon: Jovita Crews MD;  Location:  [facility];  Service: Cardiology   • CARDIAC CATHETERIZATION N/A 3/13/2019    Procedure: Left Heart Cath;  Surgeon: Jovita Crews MD;  Location:  [facility];  Service: Cardiology   • CARDIAC CATHETERIZATION N/A 3/13/2019    Procedure: Left ventriculography;  Surgeon: Jovita Crews MD;  Location:  [facility];  Service: Cardiology   • EAR MASTOIDECTOMY W/ COCHLEAR IMPLANT W/ LANDMARK Right    • HEMORRHOIDECTOMY               ECG 12 Lead  Date/Time: 7/21/2020 10:59 AM  Performed by: Jovita Crews MD  Authorized by: Jovita Crews MD   Comparison: compared with previous ECG from 1/14/2020  Similar to previous ECG  Rhythm: sinus rhythm  Rate: normal  Conduction: conduction normal  ST Segments: ST segments normal  T Waves: T waves normal  QRS axis: normal  Other: no other findings    Clinical impression: normal ECG          Outpatient Encounter Medications as of 7/21/2020   Medication Sig Dispense Refill   • allopurinol (ZYLOPRIM) 300 MG tablet Take 300 mg by mouth Daily.     • aspirin 81 MG EC tablet Take 81 mg by mouth Daily.     • brimonidine (ALPHAGAN) 0.2 % ophthalmic solution Administer 1 drop to both eyes 3 (Three) Times a Day.     • fluticasone-salmeterol (ADVAIR HFA) 115-21 MCG/ACT inhaler Inhale 2 puffs 2 (Two) Times a Day.     • irbesartan (AVAPRO) 150 MG tablet Take 150 mg by mouth Daily.     •  latanoprost (XALATAN) 0.005 % ophthalmic solution Administer 1 drop to both eyes Every Night.     • rosuvastatin (CRESTOR) 5 MG tablet rosuvastatin 5 mg tablet   TAKE 1 TABLET BY MOUTH EVERY DAY     • sertraline (ZOLOFT) 25 MG tablet Take 25 mg by mouth Daily.  6     No facility-administered encounter medications on file as of 7/21/2020.           Objective:     Physical Exam  GEN: no distress, alert and oriented. [place].   Lungs CTAB, no rales or wheezes  Chest: no abnormalities  Heart: RRR, no murmurs  Abdo: soft,  Nontender, nondistended  Extr: trace b/l edema, +2 DP and 2+ carotid pulses b/l  Skin: no rash or bruising  Psych: organized thought, normal behavior and affect          Assessment:         Nonischemic cardiomyopathy  HLD    Plan:      1. Nonischemic CM: EF 40-45%  No BB due to dizziness. Continue Irbesartan. Euvolemic with very mild LE edmea.   2. CAD: Diagonal disease on LHC, otherwise non-obstructive CAD. Continue ASA.   3. HLD: Currently on  Crestor 5. He is 85 years old with mild CAD. I will stop the statin today given his age.      Dr. Hogan, thank you for referring this kind patient to me. I have stopped his crestor today due to his age and presence of only mild CAD. I will see him in 6 months.     Jovita Crews MD  07/21/20     Outpatient Encounter Medications as of 7/21/2020   Medication Sig Dispense Refill   • allopurinol (ZYLOPRIM) 300 MG tablet Take 300 mg by mouth Daily.     • aspirin 81 MG EC tablet Take 81 mg by mouth Daily.     • brimonidine (ALPHAGAN) 0.2 % ophthalmic solution Administer 1 drop to both eyes 3 (Three) Times a Day.     • fluticasone-salmeterol (ADVAIR HFA) 115-21 MCG/ACT inhaler Inhale 2 puffs 2 (Two) Times a Day.     • irbesartan (AVAPRO) 150 MG tablet Take 150 mg by mouth Daily.     • latanoprost (XALATAN) 0.005 % ophthalmic solution Administer 1 drop to both eyes Every Night.     • sertraline (ZOLOFT) 25 MG tablet Take 25 mg by mouth Daily.  6   • [DISCONTINUED]  rosuvastatin (CRESTOR) 5 MG tablet rosuvastatin 5 mg tablet   TAKE 1 TABLET BY MOUTH EVERY DAY       No facility-administered encounter medications on file as of 7/21/2020.

## 2021-01-22 ENCOUNTER — OFFICE VISIT (OUTPATIENT)
Dept: CARDIOLOGY | Facility: CLINIC | Age: 86
End: 2021-01-22

## 2021-01-22 VITALS
WEIGHT: 219 LBS | BODY MASS INDEX: 29.03 KG/M2 | HEIGHT: 73 IN | HEART RATE: 87 BPM | SYSTOLIC BLOOD PRESSURE: 130 MMHG | DIASTOLIC BLOOD PRESSURE: 80 MMHG

## 2021-01-22 DIAGNOSIS — E78.2 MIXED HYPERLIPIDEMIA: ICD-10-CM

## 2021-01-22 DIAGNOSIS — I42.8 NICM (NONISCHEMIC CARDIOMYOPATHY) (HCC): Primary | ICD-10-CM

## 2021-01-22 PROCEDURE — 99213 OFFICE O/P EST LOW 20 MIN: CPT | Performed by: INTERNAL MEDICINE

## 2021-01-22 RX ORDER — QUETIAPINE FUMARATE 25 MG/1
TABLET, FILM COATED ORAL
COMMUNITY
Start: 2020-11-25

## 2021-01-22 NOTE — PROGRESS NOTES
Subjective:     Encounter Date: 01/22/21        Patient ID: Cookie Larsen is a 85 y.o. male.    Chief Complaint: CHF, CAD  HPI:   84-year-old man with a past medical history of nonischemic cardiomyopathy EF 40-45%, hypertension, deafness with cochlear implants, vitamin D deficiency who originally presented with a complaint of 6 months of dyspnea on exertion and intermittent chest pain with climbing stairs.  He underwent sstress testing at [place] which showed a moderate area of anterior ischemia.  He underwent left heart catheterization with me on March 11, 2019.  He had normal LV filling pressures, EF of 40-45%.  Coronary angiogram showed mild disease in the LAD with a diffusely diseased small caliber first diagonal.  The circumflex and RCA are normal.    Today he returns for follow up.  He has had no chest pain or shortness of breath.  His dizziness has resolved.  He has no lower extremity edema.  He did not bring an up-to-date medication list but he does not think anything is changed.  He struggles with depression but his wife says since starting Zoloft he has improved.    He is a former smoker.  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  He has a cochlear device that helps him here otherwise he is almost deaf. He has 5 children and is accompanied by his wife they have been  since 1962.     REVIEW OF SYSTEMS:   All systems reviewed.  Pertinent positives identified in HPI.  All other systems are negative.    The following portions of the patient's history were reviewed and updated as appropriate: allergies, current medications, past family history, past medical history, past social history, past surgical history and problem list.    Past Medical History:   Diagnosis Date   • GONZALES (dyspnea on exertion)    • Glaucoma    • Gout    • Hypertension    • IFG (impaired fasting glucose)    • NICM (nonischemic cardiomyopathy) (CMS/[facility])    • Vitamin D deficiency        No family history on  file.    Social History     Socioeconomic History   • Marital status:      Spouse name: Not on file   • Number of children: Not on file   • Years of education: Not on file   • Highest education level: Not on file   Tobacco Use   • Smoking status: Never Smoker   • Smokeless tobacco: Never Used   Substance and Sexual Activity   • Alcohol use: Yes     Comment: 1-2 daily    • Drug use: No   • Sexual activity: Defer       No Known Allergies    Past Surgical History:   Procedure Laterality Date   • CARDIAC CATHETERIZATION N/A 3/13/2019    Procedure: Coronary angiography;  Surgeon: Jovita Crews MD;  Location:  [facility];  Service: Cardiology   • CARDIAC CATHETERIZATION N/A 3/13/2019    Procedure: Left Heart Cath;  Surgeon: Jovita Crews MD;  Location:  [facility];  Service: Cardiology   • CARDIAC CATHETERIZATION N/A 3/13/2019    Procedure: Left ventriculography;  Surgeon: Jovita Crews MD;  Location:  [facility];  Service: Cardiology   • EAR MASTOIDECTOMY W/ COCHLEAR IMPLANT W/ LANDMARK Right    • HEMORRHOIDECTOMY             Procedures         Objective:     Physical Exam  GEN: no distress, alert and oriented. [place].   Lungs CTAB, no rales or wheezes  Chest: no abnormalities  Heart: RRR, no murmurs  Abdo: soft,  Nontender, nondistended  Extr: trace b/l edema, +2 DP and 2+ carotid pulses b/l  Skin: no rash or bruising  Psych: organized thought, normal behavior and affect          Assessment:         Nonischemic cardiomyopathy  HLD    Plan:      1. Nonischemic CM: EF 40-45%  No BB due to dizziness. Continue Irbesartan. Euvolemic with no lower extremity edema.  2. CAD: Diagonal disease on C, otherwise non-obstructive CAD. Continue ASA.   3. HLD: Given his age and mild coronary disease we stopped his statin in 2020.     Dr. Hogan, thank you for referring this kind patient to me.  He is always a pleasure to see.  I will see him again in 1 year.  He has been  very stable from a cardiac perspective.  Please call me if you have any questions or concerns.    Jovita Crews MD  01/22/21     Outpatient Encounter Medications as of 1/22/2021   Medication Sig Dispense Refill   • allopurinol (ZYLOPRIM) 300 MG tablet Take 300 mg by mouth Daily.     • aspirin 81 MG EC tablet Take 81 mg by mouth Daily.     • brimonidine (ALPHAGAN) 0.2 % ophthalmic solution Administer 1 drop to both eyes 3 (Three) Times a Day.     • fluticasone-salmeterol (ADVAIR HFA) 115-21 MCG/ACT inhaler Inhale 2 puffs 2 (Two) Times a Day.     • irbesartan (AVAPRO) 150 MG tablet Take 150 mg by mouth Daily.     • latanoprost (XALATAN) 0.005 % ophthalmic solution Administer 1 drop to both eyes Every Night.     • sertraline (ZOLOFT) 25 MG tablet Take 25 mg by mouth Daily.  6     No facility-administered encounter medications on file as of 1/22/2021.

## 2021-07-16 PROBLEM — I10 ESSENTIAL HYPERTENSION: Status: ACTIVE | Noted: 2021-07-16

## 2021-07-16 PROBLEM — R73.01 IMPAIRED FASTING GLUCOSE: Status: ACTIVE | Noted: 2021-07-16

## 2021-07-16 PROBLEM — J45.909 ASTHMA: Status: ACTIVE | Noted: 2021-07-16

## 2021-07-16 PROBLEM — I25.10 CORONARY ATHEROSCLEROSIS: Status: ACTIVE | Noted: 2019-03-25

## 2021-07-16 PROBLEM — I50.9 CONGESTIVE HEART FAILURE (HCC): Status: ACTIVE | Noted: 2019-09-16

## 2021-07-16 PROBLEM — F03.90 DEMENTIA (HCC): Status: ACTIVE | Noted: 2021-07-16

## 2021-07-16 PROBLEM — F32.A DEPRESSIVE DISORDER: Status: ACTIVE | Noted: 2021-07-16

## 2021-07-16 PROBLEM — M10.9 GOUT: Status: ACTIVE | Noted: 2021-07-16

## 2021-07-16 PROBLEM — M17.0 OSTEOARTHRITIS OF BOTH KNEES: Status: ACTIVE | Noted: 2021-07-16

## 2021-07-16 PROBLEM — E78.5 DYSLIPIDEMIA: Status: ACTIVE | Noted: 2021-07-16

## 2021-07-16 PROBLEM — E55.9 VITAMIN D DEFICIENCY: Status: ACTIVE | Noted: 2021-07-16

## 2021-07-16 PROBLEM — H40.9 GLAUCOMA: Status: ACTIVE | Noted: 2021-07-16

## 2021-07-19 PROBLEM — I42.8 NICM (NONISCHEMIC CARDIOMYOPATHY) (HCC): Status: ACTIVE | Noted: 2021-07-19

## 2021-07-19 PROBLEM — Z01.810 PRE-OPERATIVE CARDIOVASCULAR EXAMINATION: Status: ACTIVE | Noted: 2021-07-19

## 2021-08-04 ENCOUNTER — TELEPHONE (OUTPATIENT)
Dept: CARDIOLOGY | Facility: CLINIC | Age: 86
End: 2021-08-04

## 2021-08-04 NOTE — TELEPHONE ENCOUNTER
Kim with Dr. Hogan's office called/[phone], asking for cardiac clearance. Pt is needing knee surgery.    He was last seen by  on 1/22/21, and was to f/u in 1 year.    Can pt be cleared? It looks like he is taking ASA 81mg qd. No other AC.    Thank you,    Radha Montelongo, CMA

## 2021-08-19 ENCOUNTER — OFFICE VISIT (OUTPATIENT)
Dept: CARDIOLOGY | Facility: CLINIC | Age: 86
End: 2021-08-19

## 2021-08-19 VITALS — BODY MASS INDEX: 28.63 KG/M2 | WEIGHT: 217 LBS

## 2021-08-19 DIAGNOSIS — I10 ESSENTIAL HYPERTENSION: ICD-10-CM

## 2021-08-19 DIAGNOSIS — I42.8 NICM (NONISCHEMIC CARDIOMYOPATHY) (HCC): ICD-10-CM

## 2021-08-19 DIAGNOSIS — Z01.810 PRE-OPERATIVE CARDIOVASCULAR EXAMINATION: Primary | ICD-10-CM

## 2021-08-19 DIAGNOSIS — I27.20 PULMONARY HYPERTENSION (HCC): ICD-10-CM

## 2021-08-19 DIAGNOSIS — I25.10 ATHEROSCLEROSIS OF NATIVE CORONARY ARTERY OF NATIVE HEART WITHOUT ANGINA PECTORIS: ICD-10-CM

## 2021-08-19 DIAGNOSIS — E78.5 DYSLIPIDEMIA: ICD-10-CM

## 2021-08-19 DIAGNOSIS — R00.0 TACHYCARDIA: ICD-10-CM

## 2021-08-19 NOTE — PROGRESS NOTES
At this patient was being roomed by medical assistant Kiki it was brought to my attention that they were not aware he was scheduled to see a nurse practitioner today.  They were under the impression they were having a visit with Dr. Crews.  Wife was displeased.  I went in and spoke with the patient and his wife and explained options and apologized for miscommunication or inconvenience.  I explained the options.  They asked that I call Dr. Crews to see if she could see him today.  I spoke with Dr. Crews and she has Cath Lab responsibilities and will unfortunately be unable to see him today.  They were made aware of this.  I explained other options were that I can conduct the visit and communicate findings with Dr. Crews or they could reschedule.  They did not want to conduct a visit with me and they wanted to reschedule.  They were assisted to scheduling and I had a conversation with scheduling as they prefer to only be seen by Dr. Crews or MD and not the nurse practitioner and there is to be a note put on his chart about this for future scheduling purposes.

## 2021-08-23 ENCOUNTER — OFFICE VISIT (OUTPATIENT)
Dept: CARDIOLOGY | Facility: CLINIC | Age: 86
End: 2021-08-23

## 2021-08-23 VITALS
HEART RATE: 91 BPM | WEIGHT: 216.6 LBS | DIASTOLIC BLOOD PRESSURE: 80 MMHG | SYSTOLIC BLOOD PRESSURE: 140 MMHG | BODY MASS INDEX: 28.71 KG/M2 | HEIGHT: 73 IN

## 2021-08-23 DIAGNOSIS — I42.8 NICM (NONISCHEMIC CARDIOMYOPATHY) (HCC): Primary | ICD-10-CM

## 2021-08-23 PROCEDURE — 93000 ELECTROCARDIOGRAM COMPLETE: CPT | Performed by: INTERNAL MEDICINE

## 2021-08-23 PROCEDURE — 99214 OFFICE O/P EST MOD 30 MIN: CPT | Performed by: INTERNAL MEDICINE

## 2021-08-23 NOTE — PROGRESS NOTES
Subjective:     Encounter Date: 08/23/21        Patient ID: Cookie Larsen is a 86 y.o. male.    Chief Complaint: CHF, CAD  HPI:   84-year-old man with a past medical history of nonischemic cardiomyopathy EF 40-45%, hypertension, deafness with cochlear implants, vitamin D deficiency who originally presented with a complaint of 6 months of dyspnea on exertion and intermittent chest pain with climbing stairs.  He underwent sstress testing at [facility] which showed a moderate area of anterior ischemia.  He underwent left heart catheterization with me on March 11, 2019.  He had normal LV filling pressures, EF of 40-45%.  Coronary angiogram showed mild disease in the LAD with a diffusely diseased small caliber first diagonal.  The circumflex and RCA are normal.    Today he returns for follow up.  He has had no chest pain or shortness of breath.  His dizziness has resolved.  He has no lower extremity edema.  He did not bring an up-to-date medication list but he does not think anything is changed.  He struggles with depression but his wife says since starting Zoloft he has improved.    He is a former smoker.  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  He has a cochlear device that helps him here otherwise he is almost deaf. He has 5 children and is accompanied by his wife they have been  since 1962.     REVIEW OF SYSTEMS:   All systems reviewed.  Pertinent positives identified in HPI.  All other systems are negative.    The following portions of the patient's history were reviewed and updated as appropriate: allergies, current medications, past family history, past medical history, past social history, past surgical history and problem list.    Past Medical History:   Diagnosis Date   • Depression    • GONZALES (dyspnea on exertion)    • Glaucoma    • Gout    • Hypertension    • IFG (impaired fasting glucose)    • NICM (nonischemic cardiomyopathy) (CMS/[facility])    • Vitamin D deficiency        History reviewed.  No pertinent family history.    Social History     Socioeconomic History   • Marital status:      Spouse name: Not on file   • Number of children: Not on file   • Years of education: Not on file   • Highest education level: Not on file   Tobacco Use   • Smoking status: Never Smoker   • Smokeless tobacco: Never Used   • Tobacco comment: No Caffeine use   Substance and Sexual Activity   • Alcohol use: Yes     Comment: 1-2 daily, caffiene: iced tea 6-8oz daily    • Drug use: No   • Sexual activity: Defer       No Known Allergies    Past Surgical History:   Procedure Laterality Date   • CARDIAC CATHETERIZATION N/A 3/13/2019    Procedure: Coronary angiography;  Surgeon: Jovita Crews MD;  Location:  [facility] CATH INVASIVE LOCATION;  Service: Cardiology   • CARDIAC CATHETERIZATION N/A 3/13/2019    Procedure: Left Heart Cath;  Surgeon: Jovita Crews MD;  Location:  [facility] LOCATION;  Service: Cardiology   • CARDIAC CATHETERIZATION N/A 3/13/2019    Procedure: Left ventriculography;  Surgeon: Jovita Crews MD;  Location:  [facility] INVAS[facility] LOCATION;  Service: Cardiology   • EAR MASTOIDECTOMY W/ COCHLEAR IMPLANT W/ LANDMARK Right    • HEMORRHOIDECTOMY               ECG 12 Lead    Date/Time: 8/23/2021 12:15 PM  Performed by: Jovita Crews MD  Authorized by: Jovita Crews MD   Comparison: compared with previous ECG from 7/21/2020  Similar to previous ECG  Rhythm: sinus rhythm  Ectopy: unifocal PVCs  Rate: normal  Conduction: conduction normal  ST Segments: ST segments normal  T Waves: T waves normal  QRS axis: normal  Other findings: non-specific ST-T wave changes    Clinical impression: non-specific ECG                 Objective:     Physical Exam  GEN: no distress, alert and oriented. [place].   Lungs CTAB, no rales or wheezes  Chest: no abnormalities  Heart: RRR, no murmurs  Abdo: soft,  Nontender, nondistended  Extr: trace b/l edema, +2 DP and 2+ carotid pulses b/l  Skin: no rash or bruising  Psych:  organized thought, normal behavior and affect          Assessment:       1. Nonischemic cardiomyopathy  2. HLD    Plan:      1. Nonischemic CM: EF 40-45%  No BB due to dizziness. Continue Irbesartan. Euvolemic with no lower extremity edema. Breathing is stable on exam  2. CAD: Diagonal disease on [facility], otherwise non-obstructive CAD. Continue ASA.   3. HLD: Given his age and mild coronary disease we stopped his statin in 2020.  4. Preoperative evaluation: This is an 86 year old man with nonobstructive CAD of the diagonal based on cath in 2019. He has a mildly reduced LV EF and has never had florid heart failure. His EKG is unremarkable. He has no angina or dyspnea. His wife thinks he may need orthopedic surgery/ knee replacement in the upcoming months. I think given his age, nonobstructive CAD and CM he is at a mildly elevated risk but this is not modifiable. Additionally, he is asymptomatic. I think he may proceed with surgery with no further testing. His EKG shows a borderline prolonged QTc today likely due to seroquel, and should be repeated prior to his surgical date.      Dr. Hogan and Dr. Lozoya, thank you for referring this kind patient to me.  He is always a pleasure to see.  I will see him again in 6 months. Please call me if you have any questions or concerns.    Jovita Crews MD  08/23/21     Outpatient Encounter Medications as of 8/23/2021   Medication Sig Dispense Refill   • allopurinol (ZYLOPRIM) 300 MG tablet Take 300 mg by mouth Daily.     • aspirin 81 MG EC tablet Take 81 mg by mouth Daily.     • brimonidine (ALPHAGAN) 0.2 % ophthalmic solution Administer 1 drop to both eyes 3 (Three) Times a Day.     • Diclofenac Sodium (VOLTAREN) 1 % gel gel diclofenac 1 % topical gel     • fluticasone-salmeterol (ADVAIR HFA) 115-21 MCG/ACT inhaler Inhale 2 puffs 2 (Two) Times a Day.     • irbesartan (AVAPRO) 150 MG tablet Take 150 mg by mouth Daily.     • latanoprost (XALATAN) 0.005 % ophthalmic solution  Administer 1 drop to both eyes Every Night.     • QUEtiapine (SEROquel) 25 MG tablet TK 2 TS PO QAM. MAY TAKE 1 T PO DURING THE DAY IF NEEDED FOR AGITATION OR ANXIETY     • sertraline (ZOLOFT) 25 MG tablet Take 25 mg by mouth Daily.  6   • sulfamethoxazole-trimethoprim (BACTRIM DS,SEPTRA DS) 800-160 MG per tablet sulfamethoxazole 800 mg-trimethoprim 160 mg tablet   TAKE 1 TABLET BY MOUTH EVERY 12 HOURS       No facility-administered encounter medications on file as of 8/23/2021.

## 2024-01-21 ENCOUNTER — APPOINTMENT (OUTPATIENT)
Dept: CT IMAGING | Facility: HOSPITAL | Age: 89
DRG: 871 | End: 2024-01-21
Payer: MEDICARE

## 2024-01-21 ENCOUNTER — APPOINTMENT (OUTPATIENT)
Dept: GENERAL RADIOLOGY | Facility: HOSPITAL | Age: 89
DRG: 871 | End: 2024-01-21
Payer: MEDICARE

## 2024-01-21 ENCOUNTER — HOSPITAL ENCOUNTER (INPATIENT)
Facility: HOSPITAL | Age: 89
LOS: 5 days | Discharge: HOSPICE | DRG: 871 | End: 2024-01-26
Attending: EMERGENCY MEDICINE | Admitting: INTERNAL MEDICINE
Payer: MEDICARE

## 2024-01-21 DIAGNOSIS — A41.9 SEPTIC SHOCK: Primary | ICD-10-CM

## 2024-01-21 DIAGNOSIS — E87.0 HYPERNATREMIA: ICD-10-CM

## 2024-01-21 DIAGNOSIS — N39.0 URINARY TRACT INFECTION WITHOUT HEMATURIA, SITE UNSPECIFIED: ICD-10-CM

## 2024-01-21 DIAGNOSIS — R65.21 SEPTIC SHOCK: Primary | ICD-10-CM

## 2024-01-21 DIAGNOSIS — Z51.5 HOSPICE CARE: ICD-10-CM

## 2024-01-21 DIAGNOSIS — N17.9 ACUTE RENAL FAILURE, UNSPECIFIED ACUTE RENAL FAILURE TYPE: ICD-10-CM

## 2024-01-21 DIAGNOSIS — E87.5 HYPERKALEMIA: ICD-10-CM

## 2024-01-21 PROBLEM — R65.20 SEVERE SEPSIS: Status: ACTIVE | Noted: 2024-01-21

## 2024-01-21 PROBLEM — J44.9 CHRONIC OBSTRUCTIVE PULMONARY DISEASE, UNSPECIFIED: Status: ACTIVE | Noted: 2023-03-15

## 2024-01-21 PROBLEM — I42.9 CARDIOMYOPATHY, UNSPECIFIED: Status: ACTIVE | Noted: 2021-07-19

## 2024-01-21 PROBLEM — M13.80 OTHER SPECIFIED ARTHRITIS, UNSPECIFIED SITE: Status: ACTIVE | Noted: 2022-07-29

## 2024-01-21 PROBLEM — I11.0 HYPERTENSIVE HEART DISEASE WITH HEART FAILURE: Status: ACTIVE | Noted: 2019-09-16

## 2024-01-21 PROBLEM — E46 UNSPECIFIED PROTEIN-CALORIE MALNUTRITION: Status: ACTIVE | Noted: 2023-05-03

## 2024-01-21 PROBLEM — R13.10 DYSPHAGIA, UNSPECIFIED: Status: ACTIVE | Noted: 2022-10-17

## 2024-01-21 PROBLEM — F32.9 MAJOR DEPRESSIVE DISORDER, SINGLE EPISODE, UNSPECIFIED: Status: ACTIVE | Noted: 2022-07-29

## 2024-01-21 PROBLEM — R48.8 OTHER SYMBOLIC DYSFUNCTIONS: Status: ACTIVE | Noted: 2022-10-17

## 2024-01-21 PROBLEM — H04.123 DRY EYE SYNDROME OF BILATERAL LACRIMAL GLANDS: Status: ACTIVE | Noted: 2022-10-07

## 2024-01-21 PROBLEM — H40.1134 PRIMARY OPEN-ANGLE GLAUCOMA, BILATERAL, INDETERMINATE STAGE: Status: ACTIVE | Noted: 2022-10-07

## 2024-01-21 PROBLEM — R33.8 OTHER RETENTION OF URINE: Status: ACTIVE | Noted: 2023-04-06

## 2024-01-21 PROBLEM — F03.918 UNSPECIFIED DEMENTIA, UNSPECIFIED SEVERITY, WITH OTHER BEHAVIORAL DISTURBANCE: Status: ACTIVE | Noted: 2022-10-01

## 2024-01-21 PROBLEM — N42.9 DISORDER OF PROSTATE, UNSPECIFIED: Status: ACTIVE | Noted: 2023-04-21

## 2024-01-21 PROBLEM — Z86.718 PERSONAL HISTORY OF OTHER VENOUS THROMBOSIS AND EMBOLISM: Status: ACTIVE | Noted: 2022-07-29

## 2024-01-21 PROBLEM — H91.90 UNSPECIFIED HEARING LOSS, UNSPECIFIED EAR: Status: ACTIVE | Noted: 2022-07-29

## 2024-01-21 PROBLEM — R41.82 ALTERED MENTAL STATUS: Status: ACTIVE | Noted: 2023-03-14

## 2024-01-21 PROBLEM — M19.09 PRIMARY OSTEOARTHRITIS, OTHER SPECIFIED SITE: Status: ACTIVE | Noted: 2022-10-01

## 2024-01-21 PROBLEM — E78.5 HYPERLIPIDEMIA, UNSPECIFIED: Status: ACTIVE | Noted: 2022-07-29

## 2024-01-21 PROBLEM — Z91.81 HISTORY OF FALLING: Status: ACTIVE | Noted: 2022-07-29

## 2024-01-21 PROBLEM — F03.90 UNSPECIFIED DEMENTIA, UNSPECIFIED SEVERITY, WITHOUT BEHAVIORAL DISTURBANCE, PSYCHOTIC DISTURBANCE, MOOD DISTURBANCE, AND ANXIETY: Status: ACTIVE | Noted: 2022-07-29

## 2024-01-21 PROBLEM — F10.20 SEVERE ALCOHOL DEPENDENCE: Status: ACTIVE | Noted: 2020-09-21

## 2024-01-21 PROBLEM — Z96.21 COCHLEAR IMPLANT STATUS: Status: ACTIVE | Noted: 2022-07-29

## 2024-01-21 PROBLEM — I51.7 CARDIOMEGALY: Status: ACTIVE | Noted: 2022-07-29
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
## 2024-01-21 PROBLEM — I82.402 ACUTE DEEP VEIN THROMBOSIS (DVT) OF LEFT LOWER EXTREMITY: Status: ACTIVE | Noted: 2022-06-19

## 2024-01-21 PROBLEM — F10.90: Status: ACTIVE | Noted: 2020-09-21

## 2024-01-21 PROBLEM — R44.0 AUDITORY HALLUCINATION: Status: ACTIVE | Noted: 2020-09-21

## 2024-01-21 PROBLEM — I25.83 CORONARY ATHEROSCLEROSIS DUE TO LIPID RICH PLAQUE: Status: ACTIVE | Noted: 2019-03-25

## 2024-01-21 PROBLEM — R56.9 UNSPECIFIED CONVULSIONS: Status: ACTIVE | Noted: 2023-03-14

## 2024-01-21 PROBLEM — F03.90 DEMENTIA: Status: ACTIVE | Noted: 2020-09-21

## 2024-01-21 PROBLEM — F10.10 ALCOHOL ABUSE: Status: ACTIVE | Noted: 2022-06-19

## 2024-01-21 LAB
ALBUMIN SERPL-MCNC: 3 G/DL (ref 3.5–5.2)
ALBUMIN/GLOB SERPL: 1 G/DL
ALP SERPL-CCNC: 76 U/L (ref 39–117)
ALT SERPL W P-5'-P-CCNC: 28 U/L (ref 1–41)
ANION GAP SERPL CALCULATED.3IONS-SCNC: 12 MMOL/L (ref 5–15)
ANION GAP SERPL CALCULATED.3IONS-SCNC: 14 MMOL/L (ref 5–15)
ANISOCYTOSIS BLD QL: ABNORMAL
AST SERPL-CCNC: 33 U/L (ref 1–40)
B PARAPERT DNA SPEC QL NAA+PROBE: NOT DETECTED
B PERT DNA SPEC QL NAA+PROBE: NOT DETECTED
BACTERIA UR QL AUTO: ABNORMAL /HPF
BACTERIA UR QL AUTO: ABNORMAL /HPF
BILIRUB SERPL-MCNC: 0.4 MG/DL (ref 0–1.2)
BILIRUB UR QL STRIP: NEGATIVE
BILIRUB UR QL STRIP: NEGATIVE
BUN SERPL-MCNC: 138 MG/DL (ref 8–23)
BUN SERPL-MCNC: 150 MG/DL (ref 8–23)
BUN/CREAT SERPL: 35.6 (ref 7–25)
BUN/CREAT SERPL: 37.9 (ref 7–25)
C PNEUM DNA NPH QL NAA+NON-PROBE: NOT DETECTED
CALCIUM SPEC-SCNC: 8.7 MG/DL (ref 8.6–10.5)
CALCIUM SPEC-SCNC: 9 MG/DL (ref 8.6–10.5)
CHLORIDE SERPL-SCNC: 124 MMOL/L (ref 98–107)
CHLORIDE SERPL-SCNC: 128 MMOL/L (ref 98–107)
CK SERPL-CCNC: 812 U/L (ref 20–200)
CLARITY UR: ABNORMAL
CLARITY UR: ABNORMAL
CO2 SERPL-SCNC: 20 MMOL/L (ref 22–29)
CO2 SERPL-SCNC: 23 MMOL/L (ref 22–29)
COLOR UR: ABNORMAL
COLOR UR: ABNORMAL
CREAT SERPL-MCNC: 3.64 MG/DL (ref 0.76–1.27)
CREAT SERPL-MCNC: 4.21 MG/DL (ref 0.76–1.27)
D-LACTATE SERPL-SCNC: 3.9 MMOL/L (ref 0.3–2)
D-LACTATE SERPL-SCNC: 5.5 MMOL/L (ref 0.5–2)
DEPRECATED RDW RBC AUTO: 59.5 FL (ref 37–54)
EGFRCR SERPLBLD CKD-EPI 2021: 12.9 ML/MIN/1.73
EGFRCR SERPLBLD CKD-EPI 2021: 15.4 ML/MIN/1.73
ERYTHROCYTE [DISTWIDTH] IN BLOOD BY AUTOMATED COUNT: 18.8 % (ref 12.3–15.4)
FLUAV SUBTYP SPEC NAA+PROBE: NOT DETECTED
FLUBV RNA ISLT QL NAA+PROBE: NOT DETECTED
GEN 5 2HR TROPONIN T REFLEX: 358 NG/L
GLOBULIN UR ELPH-MCNC: 3 GM/DL
GLUCOSE BLDC GLUCOMTR-MCNC: 107 MG/DL (ref 70–105)
GLUCOSE BLDC GLUCOMTR-MCNC: 128 MG/DL (ref 70–105)
GLUCOSE BLDC GLUCOMTR-MCNC: 56 MG/DL (ref 70–105)
GLUCOSE SERPL-MCNC: 127 MG/DL (ref 65–99)
GLUCOSE SERPL-MCNC: 128 MG/DL (ref 65–99)
GLUCOSE UR STRIP-MCNC: NEGATIVE MG/DL
GLUCOSE UR STRIP-MCNC: NEGATIVE MG/DL
HADV DNA SPEC NAA+PROBE: NOT DETECTED
HCOV 229E RNA SPEC QL NAA+PROBE: NOT DETECTED
HCOV HKU1 RNA SPEC QL NAA+PROBE: NOT DETECTED
HCOV NL63 RNA SPEC QL NAA+PROBE: NOT DETECTED
HCOV OC43 RNA SPEC QL NAA+PROBE: NOT DETECTED
HCT VFR BLD AUTO: 38.6 % (ref 37.5–51)
HGB BLD-MCNC: 12 G/DL (ref 13–17.7)
HGB UR QL STRIP.AUTO: ABNORMAL
HGB UR QL STRIP.AUTO: NEGATIVE
HMPV RNA NPH QL NAA+NON-PROBE: NOT DETECTED
HPIV1 RNA ISLT QL NAA+PROBE: NOT DETECTED
HPIV2 RNA SPEC QL NAA+PROBE: NOT DETECTED
HPIV3 RNA NPH QL NAA+PROBE: NOT DETECTED
HPIV4 P GENE NPH QL NAA+PROBE: NOT DETECTED
HYALINE CASTS UR QL AUTO: ABNORMAL /LPF
HYALINE CASTS UR QL AUTO: ABNORMAL /LPF
KETONES UR QL STRIP: ABNORMAL
KETONES UR QL STRIP: ABNORMAL
L PNEUMO1 AG UR QL IA: NEGATIVE
LEUKOCYTE ESTERASE UR QL STRIP.AUTO: ABNORMAL
LEUKOCYTE ESTERASE UR QL STRIP.AUTO: ABNORMAL
LYMPHOCYTES # BLD MANUAL: 1.56 10*3/MM3 (ref 0.7–3.1)
LYMPHOCYTES NFR BLD MANUAL: 6 % (ref 5–12)
M PNEUMO IGG SER IA-ACNC: NOT DETECTED
MAGNESIUM SERPL-MCNC: 2.9 MG/DL (ref 1.6–2.4)
MCH RBC QN AUTO: 28 PG (ref 26.6–33)
MCHC RBC AUTO-ENTMCNC: 31 G/DL (ref 31.5–35.7)
MCV RBC AUTO: 90.5 FL (ref 79–97)
MONOCYTES # BLD: 0.78 10*3/MM3 (ref 0.1–0.9)
MRSA DNA SPEC QL NAA+PROBE: NORMAL
MUCOUS THREADS URNS QL MICRO: ABNORMAL /HPF
NEUTROPHILS # BLD AUTO: 10.66 10*3/MM3 (ref 1.7–7)
NEUTROPHILS NFR BLD MANUAL: 75 % (ref 42.7–76)
NEUTS BAND NFR BLD MANUAL: 7 % (ref 0–5)
NITRITE UR QL STRIP: NEGATIVE
NITRITE UR QL STRIP: NEGATIVE
NRBC SPEC MANUAL: 1 /100 WBC (ref 0–0.2)
NT-PROBNP SERPL-MCNC: 5656 PG/ML (ref 0–1800)
PH UR STRIP.AUTO: 5.5 [PH] (ref 5–8)
PH UR STRIP.AUTO: 5.5 [PH] (ref 5–8)
PHOSPHATE SERPL-MCNC: 5.3 MG/DL (ref 2.5–4.5)
PLAT MORPH BLD: NORMAL
PLATELET # BLD AUTO: 374 10*3/MM3 (ref 140–450)
PMV BLD AUTO: 9.4 FL (ref 6–12)
POTASSIUM SERPL-SCNC: 5.4 MMOL/L (ref 3.5–5.2)
POTASSIUM SERPL-SCNC: 5.5 MMOL/L (ref 3.5–5.2)
POTASSIUM SERPL-SCNC: 6.1 MMOL/L (ref 3.5–5.2)
PROCALCITONIN SERPL-MCNC: 0.35 NG/ML (ref 0–0.25)
PROT SERPL-MCNC: 6 G/DL (ref 6–8.5)
PROT UR QL STRIP: ABNORMAL
PROT UR QL STRIP: ABNORMAL
RBC # BLD AUTO: 4.27 10*6/MM3 (ref 4.14–5.8)
RBC # UR STRIP: ABNORMAL /HPF
RBC # UR STRIP: ABNORMAL /HPF
REF LAB TEST METHOD: ABNORMAL
REF LAB TEST METHOD: ABNORMAL
RHINOVIRUS RNA SPEC NAA+PROBE: NOT DETECTED
RSV RNA NPH QL NAA+NON-PROBE: NOT DETECTED
S PNEUM AG SPEC QL LA: NEGATIVE
SARS-COV-2 RNA NPH QL NAA+NON-PROBE: NOT DETECTED
SCAN SLIDE: NORMAL
SODIUM SERPL-SCNC: 160 MMOL/L (ref 136–145)
SODIUM SERPL-SCNC: 161 MMOL/L (ref 136–145)
SODIUM UR-SCNC: 29 MMOL/L
SP GR UR STRIP: 1.02 (ref 1–1.03)
SP GR UR STRIP: 1.02 (ref 1–1.03)
SQUAMOUS #/AREA URNS HPF: ABNORMAL /HPF
SQUAMOUS #/AREA URNS HPF: ABNORMAL /HPF
TRANS CELLS #/AREA URNS HPF: ABNORMAL /HPF
TROPONIN T DELTA: 11 NG/L
TROPONIN T SERPL HS-MCNC: 334 NG/L
TROPONIN T SERPL HS-MCNC: 347 NG/L
UROBILINOGEN UR QL STRIP: ABNORMAL
UROBILINOGEN UR QL STRIP: ABNORMAL
VARIANT LYMPHS NFR BLD MANUAL: 12 % (ref 19.6–45.3)
WBC # UR STRIP: ABNORMAL /HPF
WBC # UR STRIP: ABNORMAL /HPF
WBC MORPH BLD: NORMAL
WBC NRBC COR # BLD AUTO: 13 10*3/MM3 (ref 3.4–10.8)

## 2024-01-21 PROCEDURE — 83605 ASSAY OF LACTIC ACID: CPT | Performed by: EMERGENCY MEDICINE

## 2024-01-21 PROCEDURE — 25010000002 CALCIUM GLUCONATE-NACL 1-0.675 GM/50ML-% SOLUTION: Performed by: EMERGENCY MEDICINE

## 2024-01-21 PROCEDURE — 84145 PROCALCITONIN (PCT): CPT

## 2024-01-21 PROCEDURE — 83735 ASSAY OF MAGNESIUM: CPT | Performed by: EMERGENCY MEDICINE

## 2024-01-21 PROCEDURE — 63710000001 INSULIN REGULAR HUMAN PER 5 UNITS: Performed by: EMERGENCY MEDICINE

## 2024-01-21 PROCEDURE — 93005 ELECTROCARDIOGRAM TRACING: CPT | Performed by: EMERGENCY MEDICINE

## 2024-01-21 PROCEDURE — 81001 URINALYSIS AUTO W/SCOPE: CPT | Performed by: EMERGENCY MEDICINE

## 2024-01-21 PROCEDURE — 81001 URINALYSIS AUTO W/SCOPE: CPT | Performed by: NURSE PRACTITIONER

## 2024-01-21 PROCEDURE — 0202U NFCT DS 22 TRGT SARS-COV-2: CPT | Performed by: EMERGENCY MEDICINE

## 2024-01-21 PROCEDURE — 25810000003 SODIUM CHLORIDE 0.9 % SOLUTION: Performed by: EMERGENCY MEDICINE

## 2024-01-21 PROCEDURE — 80053 COMPREHEN METABOLIC PANEL: CPT | Performed by: EMERGENCY MEDICINE

## 2024-01-21 PROCEDURE — 84484 ASSAY OF TROPONIN QUANT: CPT | Performed by: NURSE PRACTITIONER

## 2024-01-21 PROCEDURE — 70450 CT HEAD/BRAIN W/O DYE: CPT

## 2024-01-21 PROCEDURE — 87449 NOS EACH ORGANISM AG IA: CPT | Performed by: NURSE PRACTITIONER

## 2024-01-21 PROCEDURE — 84132 ASSAY OF SERUM POTASSIUM: CPT | Performed by: EMERGENCY MEDICINE

## 2024-01-21 PROCEDURE — P9612 CATHETERIZE FOR URINE SPEC: HCPCS

## 2024-01-21 PROCEDURE — 82550 ASSAY OF CK (CPK): CPT | Performed by: EMERGENCY MEDICINE

## 2024-01-21 PROCEDURE — 82948 REAGENT STRIP/BLOOD GLUCOSE: CPT

## 2024-01-21 PROCEDURE — 87102 FUNGUS ISOLATION CULTURE: CPT

## 2024-01-21 PROCEDURE — 0 DEXTROSE 5 % SOLUTION: Performed by: NURSE PRACTITIONER

## 2024-01-21 PROCEDURE — 87040 BLOOD CULTURE FOR BACTERIA: CPT | Performed by: EMERGENCY MEDICINE

## 2024-01-21 PROCEDURE — 84300 ASSAY OF URINE SODIUM: CPT

## 2024-01-21 PROCEDURE — 87086 URINE CULTURE/COLONY COUNT: CPT | Performed by: NURSE PRACTITIONER

## 2024-01-21 PROCEDURE — 85025 COMPLETE CBC W/AUTO DIFF WBC: CPT | Performed by: EMERGENCY MEDICINE

## 2024-01-21 PROCEDURE — 25010000002 CEFEPIME PER 500 MG: Performed by: EMERGENCY MEDICINE

## 2024-01-21 PROCEDURE — 84484 ASSAY OF TROPONIN QUANT: CPT | Performed by: EMERGENCY MEDICINE

## 2024-01-21 PROCEDURE — 25010000002 VANCOMYCIN HCL IN NACL 1.5-0.9 GM/500ML-% SOLUTION: Performed by: NURSE PRACTITIONER

## 2024-01-21 PROCEDURE — 99291 CRITICAL CARE FIRST HOUR: CPT

## 2024-01-21 PROCEDURE — 36415 COLL VENOUS BLD VENIPUNCTURE: CPT | Performed by: NURSE PRACTITIONER

## 2024-01-21 PROCEDURE — 84100 ASSAY OF PHOSPHORUS: CPT | Performed by: EMERGENCY MEDICINE

## 2024-01-21 PROCEDURE — 87641 MR-STAPH DNA AMP PROBE: CPT | Performed by: NURSE PRACTITIONER

## 2024-01-21 PROCEDURE — 71045 X-RAY EXAM CHEST 1 VIEW: CPT

## 2024-01-21 PROCEDURE — 25010000002 HEPARIN (PORCINE) PER 1000 UNITS

## 2024-01-21 PROCEDURE — 25810000003 SEPSIS FLUID NS 0.9 % SOLUTION: Performed by: EMERGENCY MEDICINE

## 2024-01-21 PROCEDURE — 83880 ASSAY OF NATRIURETIC PEPTIDE: CPT | Performed by: NURSE PRACTITIONER

## 2024-01-21 PROCEDURE — 85007 BL SMEAR W/DIFF WBC COUNT: CPT | Performed by: EMERGENCY MEDICINE

## 2024-01-21 PROCEDURE — 87086 URINE CULTURE/COLONY COUNT: CPT | Performed by: EMERGENCY MEDICINE

## 2024-01-21 RX ORDER — ONDANSETRON 2 MG/ML
4 INJECTION INTRAMUSCULAR; INTRAVENOUS EVERY 6 HOURS PRN
Status: DISCONTINUED | OUTPATIENT
Start: 2024-01-21 | End: 2024-01-26 | Stop reason: HOSPADM

## 2024-01-21 RX ORDER — SODIUM CHLORIDE 0.9 % (FLUSH) 0.9 %
10 SYRINGE (ML) INJECTION AS NEEDED
Status: DISCONTINUED | OUTPATIENT
Start: 2024-01-21 | End: 2024-01-26 | Stop reason: HOSPADM

## 2024-01-21 RX ORDER — SODIUM CHLORIDE 0.9 % (FLUSH) 0.9 %
10 SYRINGE (ML) INJECTION EVERY 12 HOURS SCHEDULED
Status: DISCONTINUED | OUTPATIENT
Start: 2024-01-21 | End: 2024-01-26 | Stop reason: HOSPADM

## 2024-01-21 RX ORDER — BISACODYL 10 MG
10 SUPPOSITORY, RECTAL RECTAL DAILY PRN
Status: DISCONTINUED | OUTPATIENT
Start: 2024-01-21 | End: 2024-01-22

## 2024-01-21 RX ORDER — ONDANSETRON 4 MG/1
4 TABLET, ORALLY DISINTEGRATING ORAL EVERY 6 HOURS PRN
Status: DISCONTINUED | OUTPATIENT
Start: 2024-01-21 | End: 2024-01-26 | Stop reason: HOSPADM

## 2024-01-21 RX ORDER — POLYETHYLENE GLYCOL 3350 17 G/17G
17 POWDER, FOR SOLUTION ORAL DAILY PRN
Status: DISCONTINUED | OUTPATIENT
Start: 2024-01-21 | End: 2024-01-22

## 2024-01-21 RX ORDER — VANCOMYCIN/0.9 % SOD CHLORIDE 1.5G/250ML
1500 PLASTIC BAG, INJECTION (ML) INTRAVENOUS ONCE
Status: COMPLETED | OUTPATIENT
Start: 2024-01-21 | End: 2024-01-21

## 2024-01-21 RX ORDER — DEXTROSE MONOHYDRATE 50 MG/ML
100 INJECTION, SOLUTION INTRAVENOUS CONTINUOUS
Status: DISCONTINUED | OUTPATIENT
Start: 2024-01-21 | End: 2024-01-22

## 2024-01-21 RX ORDER — TAMSULOSIN HYDROCHLORIDE 0.4 MG/1
1 CAPSULE ORAL DAILY
COMMUNITY

## 2024-01-21 RX ORDER — DEXTROSE MONOHYDRATE 25 G/50ML
INJECTION, SOLUTION INTRAVENOUS
Status: ACTIVE
Start: 2024-01-21 | End: 2024-01-22

## 2024-01-21 RX ORDER — BISACODYL 5 MG/1
5 TABLET, DELAYED RELEASE ORAL DAILY PRN
Status: DISCONTINUED | OUTPATIENT
Start: 2024-01-21 | End: 2024-01-22

## 2024-01-21 RX ORDER — NITROGLYCERIN 0.4 MG/1
0.4 TABLET SUBLINGUAL
Status: DISCONTINUED | OUTPATIENT
Start: 2024-01-21 | End: 2024-01-26 | Stop reason: HOSPADM

## 2024-01-21 RX ORDER — CITALOPRAM 20 MG/1
20 TABLET ORAL DAILY
COMMUNITY

## 2024-01-21 RX ORDER — AMOXICILLIN 250 MG
2 CAPSULE ORAL 2 TIMES DAILY PRN
Status: DISCONTINUED | OUTPATIENT
Start: 2024-01-21 | End: 2024-01-22

## 2024-01-21 RX ORDER — CALCIUM GLUCONATE 20 MG/ML
1000 INJECTION, SOLUTION INTRAVENOUS ONCE
Status: COMPLETED | OUTPATIENT
Start: 2024-01-21 | End: 2024-01-21

## 2024-01-21 RX ORDER — RISPERIDONE 0.25 MG/1
0.25 TABLET ORAL NIGHTLY
COMMUNITY

## 2024-01-21 RX ORDER — LOSARTAN POTASSIUM 100 MG/1
100 TABLET ORAL DAILY
COMMUNITY
End: 2024-01-26 | Stop reason: HOSPADM

## 2024-01-21 RX ORDER — HEPARIN SODIUM 5000 [USP'U]/ML
5000 INJECTION, SOLUTION INTRAVENOUS; SUBCUTANEOUS EVERY 8 HOURS SCHEDULED
Status: DISCONTINUED | OUTPATIENT
Start: 2024-01-21 | End: 2024-01-26 | Stop reason: HOSPADM

## 2024-01-21 RX ORDER — IBUPROFEN 600 MG/1
1 TABLET ORAL
Status: DISCONTINUED | OUTPATIENT
Start: 2024-01-21 | End: 2024-01-26 | Stop reason: HOSPADM

## 2024-01-21 RX ORDER — MIRTAZAPINE 15 MG/1
15 TABLET, FILM COATED ORAL DAILY
COMMUNITY
End: 2024-01-26 | Stop reason: HOSPADM

## 2024-01-21 RX ORDER — PANTOPRAZOLE SODIUM 40 MG/10ML
40 INJECTION, POWDER, LYOPHILIZED, FOR SOLUTION INTRAVENOUS
Status: DISCONTINUED | OUTPATIENT
Start: 2024-01-22 | End: 2024-01-26 | Stop reason: HOSPADM

## 2024-01-21 RX ORDER — DEXTROSE MONOHYDRATE 25 G/50ML
25 INJECTION, SOLUTION INTRAVENOUS
Status: DISCONTINUED | OUTPATIENT
Start: 2024-01-21 | End: 2024-01-26 | Stop reason: HOSPADM

## 2024-01-21 RX ORDER — NICOTINE POLACRILEX 4 MG
15 LOZENGE BUCCAL
Status: DISCONTINUED | OUTPATIENT
Start: 2024-01-21 | End: 2024-01-26 | Stop reason: HOSPADM

## 2024-01-21 RX ORDER — DOCUSATE SODIUM 100 MG/1
100 CAPSULE, LIQUID FILLED ORAL 2 TIMES DAILY
COMMUNITY

## 2024-01-21 RX ORDER — TRAMADOL HYDROCHLORIDE 50 MG/1
50 TABLET ORAL 2 TIMES DAILY
COMMUNITY
End: 2024-01-26 | Stop reason: HOSPADM

## 2024-01-21 RX ORDER — DEXTROSE MONOHYDRATE 25 G/50ML
25 INJECTION, SOLUTION INTRAVENOUS ONCE
Status: COMPLETED | OUTPATIENT
Start: 2024-01-21 | End: 2024-01-21

## 2024-01-21 RX ORDER — FAMOTIDINE 10 MG
10 TABLET ORAL 2 TIMES DAILY
COMMUNITY
End: 2024-01-26 | Stop reason: HOSPADM

## 2024-01-21 RX ORDER — SODIUM CHLORIDE 9 MG/ML
40 INJECTION, SOLUTION INTRAVENOUS AS NEEDED
Status: DISCONTINUED | OUTPATIENT
Start: 2024-01-21 | End: 2024-01-26 | Stop reason: HOSPADM

## 2024-01-21 RX ORDER — DIVALPROEX SODIUM 125 MG/1
250 TABLET, DELAYED RELEASE ORAL 2 TIMES DAILY
COMMUNITY

## 2024-01-21 RX ORDER — LORAZEPAM 0.5 MG/1
0.5 TABLET ORAL 2 TIMES DAILY
COMMUNITY

## 2024-01-21 RX ADMIN — HEPARIN SODIUM 5000 UNITS: 5000 INJECTION, SOLUTION INTRAVENOUS; SUBCUTANEOUS at 23:32

## 2024-01-21 RX ADMIN — INSULIN HUMAN 5 UNITS: 100 INJECTION, SOLUTION PARENTERAL at 17:19

## 2024-01-21 RX ADMIN — CEFEPIME 2000 MG: 2 INJECTION, POWDER, FOR SOLUTION INTRAVENOUS at 16:01

## 2024-01-21 RX ADMIN — Medication 1500 MG: at 20:48

## 2024-01-21 RX ADMIN — Medication 10 ML: at 21:00

## 2024-01-21 RX ADMIN — DEXTROSE MONOHYDRATE 25 G: 25 INJECTION, SOLUTION INTRAVENOUS at 19:04

## 2024-01-21 RX ADMIN — SODIUM CHLORIDE 1000 ML: 9 INJECTION, SOLUTION INTRAVENOUS at 18:45

## 2024-01-21 RX ADMIN — SODIUM CHLORIDE 2274 ML: 9 INJECTION, SOLUTION INTRAVENOUS at 15:59

## 2024-01-21 RX ADMIN — DEXTROSE MONOHYDRATE 100 ML/HR: 50 INJECTION, SOLUTION INTRAVENOUS at 19:53

## 2024-01-21 RX ADMIN — CALCIUM GLUCONATE 1000 MG: 20 INJECTION, SOLUTION INTRAVENOUS at 17:19

## 2024-01-21 RX ADMIN — DEXTROSE MONOHYDRATE 25 G: 25 INJECTION, SOLUTION INTRAVENOUS at 17:19

## 2024-01-21 NOTE — ED NOTES
Pt appears more comfortable, eyes closed, no longer trembling. Pt does not follow any commands. Pt in no apparent distress at this time, respirations even and unlabored.

## 2024-01-21 NOTE — ED NOTES
Dr Ordoñez informed of pt's continued low core temperature despite multiple warm blankets. This writer given verbal permission to apply [place] warming blanket to pt. This was done. Pt opens eyes very slightly, pt attempts to pull arms back when touched, occasionally moves feet. Pt in no apparent distress at this time, family members not in room at this time.

## 2024-01-21 NOTE — ED NOTES
Pt turned from supine position to right side, pillows tucked underneath. No urine output. Small watery brown BM. Inés care performed, pt in no apparent distress at this time. Pt continues to tremble slightly.

## 2024-01-21 NOTE — ED NOTES
EKG attempted, pt trembling too much for accurate read despite multiple attempts to reposition leads, pt is under multiple warm blankets. Dr Ordoñez informed.

## 2024-01-21 NOTE — ED PROVIDER NOTES
Subjective   History of Present Illness  Chief complaint: Altered mental status    88-year-old male presents from a nursing home with altered mental status.  Patient does have a history of severe dementia however nursing home reports he has been declining over the past couple weeks.  He has not been wanting to eat or drink.  He has been generally weak and sleeping more.  Family is concerned that he is overmedicated.  They apparently had a meeting with the nursing home staff a few days ago and it was agreed that they would start weaning some of his medicine to see if his mental status improved.    History provided by:  EMS personnel and relative      Review of Systems   Unable to perform ROS: Mental status change       Past Medical History:   Diagnosis Date    Depression     GONZALES (dyspnea on exertion)     Glaucoma     Gout     Hypertension     IFG (impaired fasting glucose)     NICM (nonischemic cardiomyopathy)     Vitamin D deficiency        No Known Allergies    Past Surgical History:   Procedure Laterality Date    CARDIAC CATHETERIZATION N/A 3/13/2019    Procedure: Coronary angiography;  Surgeon: Jovita Crews MD;  Location: [facility] INVASIVE LOCATION;  Service: Cardiology    CARDIAC CATHETERIZATION N/A 3/13/2019    Procedure: Left Heart Cath;  Surgeon: Jovita Crews MD;  Location: [facility] CATH INVASIVE LOCATION;  Service: Cardiology    CARDIAC CATHETERIZATION N/A 3/13/2019    Procedure: Left ventriculography;  Surgeon: Jovita Crews MD;  Location: [facility] INVASIVE LOCATION;  Service: Cardiology    EAR MASTOIDECTOMY W/ COCHLEAR IMPLANT W/ LANDMARK Right     HEMORRHOIDECTOMY         No family history on file.    Social History     Socioeconomic History    Marital status:    Tobacco Use    Smoking status: Never    Smokeless tobacco: Never    Tobacco comments:     No Caffeine use   Substance and Sexual Activity    Alcohol use: Yes     Comment: 1-2 daily, caffiene: iced tea 6-8oz daily     Drug use: No  "   Sexual activity: Defer       BP (!) 72/46   Pulse 104   Temp 95.7 °F (35.4 °C) (Rectal)   Resp 20   Ht 185.4 cm (73\")   Wt 75.8 kg (167 lb)   SpO2 96%   BMI 22.03 kg/m²       Objective   Physical Exam  Vitals and nursing note reviewed.   Constitutional:       Comments: Eyes closed, not following commands, no verbal response   HENT:      Head: Normocephalic and atraumatic.      Mouth/Throat:      Mouth: Mucous membranes are moist.   Eyes:      Pupils: Pupils are equal, round, and reactive to light.   Cardiovascular:      Rate and Rhythm: Normal rate and regular rhythm.      Heart sounds: Normal heart sounds.   Pulmonary:      Effort: Pulmonary effort is normal. No respiratory distress.      Breath sounds: Normal breath sounds.   Abdominal:      General: Bowel sounds are normal.      Palpations: Abdomen is soft.      Tenderness: There is no abdominal tenderness.   Musculoskeletal:         General: No deformity or signs of injury.   Skin:     General: Skin is warm and dry.   Neurological:      GCS: GCS eye subscore is 1. GCS verbal subscore is 1. GCS motor subscore is 5.         Procedures           ED Course      Results for orders placed or performed during the hospital encounter of 01/21/24   Comprehensive Metabolic Panel    Specimen: Arm, Right; Blood   Result Value Ref Range    Glucose 128 (H) 65 - 99 mg/dL     (H) 8 - 23 mg/dL    Creatinine 4.21 (H) 0.76 - 1.27 mg/dL    Sodium 161 (C) 136 - 145 mmol/L    Potassium 6.1 (C) 3.5 - 5.2 mmol/L    Chloride 124 (H) 98 - 107 mmol/L    CO2 23.0 22.0 - 29.0 mmol/L    Calcium 9.0 8.6 - 10.5 mg/dL    Total Protein 6.0 6.0 - 8.5 g/dL    Albumin 3.0 (L) 3.5 - 5.2 g/dL    ALT (SGPT) 28 1 - 41 U/L    AST (SGOT) 33 1 - 40 U/L    Alkaline Phosphatase 76 39 - 117 U/L    Total Bilirubin 0.4 0.0 - 1.2 mg/dL    Globulin 3.0 gm/dL    A/G Ratio 1.0 g/dL    BUN/Creatinine Ratio 35.6 (H) 7.0 - 25.0    Anion Gap 14.0 5.0 - 15.0 mmol/L    eGFR 12.9 (L) >60.0 mL/min/1.73 "   Urinalysis With Culture If Indicated - Straight Cath    Specimen: Straight Cath; Urine   Result Value Ref Range    Color, UA Dark Yellow (A) Yellow, Straw    Appearance, UA Turbid (A) Clear    pH, UA 5.5 5.0 - 8.0    Specific Gravity, UA 1.016 1.005 - 1.030    Glucose, UA Negative Negative    Ketones, UA 15 mg/dL (1+) (A) Negative    Bilirubin, UA Negative Negative    Blood, UA Negative Negative    Protein, UA 30 mg/dL (1+) (A) Negative    Leuk Esterase, UA Moderate (2+) (A) Negative    Nitrite, UA Negative Negative    Urobilinogen, UA 1.0 E.U./dL 0.2 - 1.0 E.U./dL   Single High Sensitivity Troponin T    Specimen: Arm, Right; Blood   Result Value Ref Range    HS Troponin T 347 (C) <22 ng/L   Magnesium    Specimen: Arm, Right; Blood   Result Value Ref Range    Magnesium 2.9 (H) 1.6 - 2.4 mg/dL   Phosphorus    Specimen: Arm, Right; Blood   Result Value Ref Range    Phosphorus 5.3 (H) 2.5 - 4.5 mg/dL   CK    Specimen: Arm, Right; Blood   Result Value Ref Range    Creatine Kinase 812 (H) 20 - 200 U/L   CBC Auto Differential    Specimen: Blood   Result Value Ref Range    WBC 13.00 (H) 3.40 - 10.80 10*3/mm3    RBC 4.27 4.14 - 5.80 10*6/mm3    Hemoglobin 12.0 (L) 13.0 - 17.7 g/dL    Hematocrit 38.6 37.5 - 51.0 %    MCV 90.5 79.0 - 97.0 fL    MCH 28.0 26.6 - 33.0 pg    MCHC 31.0 (L) 31.5 - 35.7 g/dL    RDW 18.8 (H) 12.3 - 15.4 %    RDW-SD 59.5 (H) 37.0 - 54.0 fl    MPV 9.4 6.0 - 12.0 fL    Platelets 374 140 - 450 10*3/mm3   Scan Slide    Specimen: Blood   Result Value Ref Range    Scan Slide     Urinalysis, Microscopic Only - Straight Cath    Specimen: Straight Cath; Urine   Result Value Ref Range    RBC, UA 0-2 None Seen, 0-2 /HPF    WBC, UA 21-50 (A) None Seen, 0-2 /HPF    Bacteria, UA 4+ (A) None Seen /HPF    Squamous Epithelial Cells, UA 3-6 (A) None Seen, 0-2 /HPF    Transitional Epithelial Cells, UA 3-6 (A) 0 - 2 /HPF    Hyaline Casts, UA 3-6 None Seen /LPF    Mucus, UA Trace None Seen, Trace /HPF    Methodology  Manual Light Microscopy    Manual Differential    Specimen: Blood   Result Value Ref Range    Neutrophil % 75.0 42.7 - 76.0 %    Lymphocyte % 12.0 (L) 19.6 - 45.3 %    Monocyte % 6.0 5.0 - 12.0 %    Bands %  7.0 (H) 0.0 - 5.0 %    Neutrophils Absolute 10.66 (H) 1.70 - 7.00 10*3/mm3    Lymphocytes Absolute 1.56 0.70 - 3.10 10*3/mm3    Monocytes Absolute 0.78 0.10 - 0.90 10*3/mm3    nRBC 1.0 (H) 0.0 - 0.2 /100 WBC    Anisocytosis Slight/1+ None Seen    WBC Morphology Normal Normal    Platelet Morphology Normal Normal   POC Lactate    Specimen: Blood   Result Value Ref Range    Lactate 3.9 (C) 0.3 - 2.0 mmol/L   POC Glucose Once    Specimen: Blood   Result Value Ref Range    Glucose 107 (H) 70 - 105 mg/dL   ECG 12 Lead Altered Mental Status   Result Value Ref Range    QT Interval 389 ms    QTC Interval 504 ms     CT Head Without Contrast    Result Date: 1/21/2024  Impression: 1.Global atrophy. 2.White matter changes which could reflect more chronic small vessel ischemic change 3.Evidence for previous mastoid surgery 4.Suggested cochlear implant. Electronically Signed: Salazar Flores MD  1/21/2024 6:05 PM EST  Workstation ID: [id number]    XR Chest 1 View    Result Date: 1/21/2024  Impression: 1.An acute pulmonary process is not apparent. Electronically Signed: Salazar Flores MD  1/21/2024 3:36 PM EST  Workstation ID: [id number]                                My interpretation of EKG shows sinus tachycardia, rate of 100, no ST elevation, artifact present          Medical Decision Making  Amount and/or Complexity of Data Reviewed  Labs: ordered.  Radiology: ordered.  ECG/medicine tests: ordered.    Risk  OTC drugs.  Prescription drug management.      Patient had the above evaluation.  Results were discussed with the family.  Patient did meet sepsis criteria and sepsis protocol was initiated.  White blood cell count was 13.0.  Lactic acid was 3.9.  Blood cultures were obtained.  Patient was given a dose of cefepime.  Patient was  found to have a urinary tract infection.  CT head showed no acute intracranial abnormality.  Chest x-ray shows no acute pulmonary process.  CMP significant for acute renal failure with  and creatinine 4.21.  Sodium is elevated at 161 and potassium is elevated at 6.1.  He was given hyperkalemia meds.  Patient was somewhat hypotensive in the emergency room and was given a 30 cc/kg fluid bolus.  His blood pressure did improve with this.  I discussed with the nurse practitioner on-call for the intensivist and the patient will be admitted to the ICU.  Critical care time totaled 40 minutes.      SEPTIC SHOCK FOCUSED EXAM ATTESTATION    I attest that I have reassessed tissue perfusion after the fluid bolus given.    Juan Ordoñez MD  01/21/24  18:29 EST        Final diagnoses:   Septic shock   Urinary tract infection without hematuria, site unspecified   Acute renal failure, unspecified acute renal failure type   Hypernatremia   Hyperkalemia       ED Disposition  ED Disposition       ED Disposition   Decision to Admit    Condition   --    Comment   Level of Care: Critical Care [6]   Admitting Physician: SARA LIZAMA [[id number]]   Attending Physician: SARA LIZAMA [[id number]]                 No follow-up provider specified.       Medication List      No changes were made to your prescriptions during this visit.            Juan Ordoñez MD  01/21/24 [id number]

## 2024-01-21 NOTE — ED NOTES
EKG attempted twice, pt still trembling despite multiple warm blankets. Reading obtained, appears to have significant artifact from trembling. EKG given to Dr Ordoñez.

## 2024-01-21 NOTE — LETTER
EMS Transport Request  For use at [facility], [place], [place], [place], and [place] only   Patient Name: Cookie Larsen : 1935   Weight:74.7 kg (164 lb 10.9 oz) Pick-up Location: [facility] BLS/ALS: BLS/ALS: BLS   Insurance: MEDICARE Auth End Date:    Pre-Cert #: D/C Summary complete:    Destination: Other [address]   Contact Precautions: None   Equipment (O2, Fluids, etc.): O2, settings 2L O2   Arrive By Date/Time: 24 1100 Stretcher/WC: Stretcher   CM Requesting: Keyla Pierre RN Ext: [phone]   Notes/Medical Necessity: Bedbound at [facility].  Stage 1 heel, stage 2 buttock.  2L O2     ______________________________________________________________________    *Only 2 patient bags OR 1 carry-on size bag are permitted.  Wheelchairs and walkers CANNOT transported with the patient. Acknowledge: Yes

## 2024-01-22 ENCOUNTER — APPOINTMENT (OUTPATIENT)
Dept: CARDIOLOGY | Facility: HOSPITAL | Age: 89
DRG: 871 | End: 2024-01-22
Payer: MEDICARE

## 2024-01-22 LAB
ANION GAP SERPL CALCULATED.3IONS-SCNC: 13 MMOL/L (ref 5–15)
BASOPHILS # BLD AUTO: 0 10*3/MM3 (ref 0–0.2)
BASOPHILS NFR BLD AUTO: 0.3 % (ref 0–1.5)
BH CV ECHO MEAS - AO MAX PG: 3.9 MMHG
BH CV ECHO MEAS - AO MEAN PG: 3 MMHG
BH CV ECHO MEAS - AO V2 MAX: 99.1 CM/SEC
BH CV ECHO MEAS - AO V2 VTI: 17.8 CM
BH CV ECHO MEAS - AVA(I,D): 3.2 CM2
BH CV ECHO MEAS - EDV(CUBED): 74.1 ML
BH CV ECHO MEAS - EDV(MOD-SP4): 73.1 ML
BH CV ECHO MEAS - EF(MOD-SP4): 59.2 %
BH CV ECHO MEAS - ESV(CUBED): 24.4 ML
BH CV ECHO MEAS - ESV(MOD-SP4): 29.8 ML
BH CV ECHO MEAS - FS: 31 %
BH CV ECHO MEAS - IVS/LVPW: 0.93 CM
BH CV ECHO MEAS - IVSD: 1.3 CM
BH CV ECHO MEAS - LA DIMENSION: 3.3 CM
BH CV ECHO MEAS - LV MASS(C)D: 212.3 GRAMS
BH CV ECHO MEAS - LV MAX PG: 3 MMHG
BH CV ECHO MEAS - LV MEAN PG: 2 MMHG
BH CV ECHO MEAS - LV V1 MAX: 86.2 CM/SEC
BH CV ECHO MEAS - LV V1 VTI: 19.8 CM
BH CV ECHO MEAS - LVIDD: 4.2 CM
BH CV ECHO MEAS - LVIDS: 2.9 CM
BH CV ECHO MEAS - LVOT AREA: 2.8 CM2
BH CV ECHO MEAS - LVOT DIAM: 1.9 CM
BH CV ECHO MEAS - LVPWD: 1.4 CM
BH CV ECHO MEAS - MV A MAX VEL: 71.3 CM/SEC
BH CV ECHO MEAS - MV DEC SLOPE: 325.5 CM/SEC2
BH CV ECHO MEAS - MV DEC TIME: 0.29 SEC
BH CV ECHO MEAS - MV E MAX VEL: 56.1 CM/SEC
BH CV ECHO MEAS - MV E/A: 0.79
BH CV ECHO MEAS - MV MAX PG: 4.8 MMHG
BH CV ECHO MEAS - MV MEAN PG: 3 MMHG
BH CV ECHO MEAS - MV P1/2T: 96.3 MSEC
BH CV ECHO MEAS - MV V2 VTI: 17.1 CM
BH CV ECHO MEAS - MVA(P1/2T): 2.28 CM2
BH CV ECHO MEAS - MVA(VTI): 3.3 CM2
BH CV ECHO MEAS - SV(LVOT): 56.1 ML
BH CV ECHO MEAS - SV(MOD-SP4): 43.3 ML
BH CV ECHO MEAS - TR MAX PG: 25.2 MMHG
BH CV ECHO MEAS - TR MAX VEL: 251 CM/SEC
BUN SERPL-MCNC: 113 MG/DL (ref 8–23)
BUN/CREAT SERPL: 41.5 (ref 7–25)
CALCIUM SPEC-SCNC: 8.3 MG/DL (ref 8.6–10.5)
CHLORIDE SERPL-SCNC: 118 MMOL/L (ref 98–107)
CK SERPL-CCNC: 1624 U/L (ref 20–200)
CO2 SERPL-SCNC: 19 MMOL/L (ref 22–29)
CREAT SERPL-MCNC: 2.72 MG/DL (ref 0.76–1.27)
DEPRECATED RDW RBC AUTO: 59.5 FL (ref 37–54)
EGFRCR SERPLBLD CKD-EPI 2021: 21.8 ML/MIN/1.73
EOSINOPHIL # BLD AUTO: 0 10*3/MM3 (ref 0–0.4)
EOSINOPHIL NFR BLD AUTO: 0.3 % (ref 0.3–6.2)
ERYTHROCYTE [DISTWIDTH] IN BLOOD BY AUTOMATED COUNT: 18.2 % (ref 12.3–15.4)
GLUCOSE BLDC GLUCOMTR-MCNC: 110 MG/DL (ref 70–105)
GLUCOSE BLDC GLUCOMTR-MCNC: 112 MG/DL (ref 70–105)
GLUCOSE BLDC GLUCOMTR-MCNC: 117 MG/DL (ref 70–105)
GLUCOSE BLDC GLUCOMTR-MCNC: 127 MG/DL (ref 70–105)
GLUCOSE SERPL-MCNC: 325 MG/DL (ref 65–99)
HCT VFR BLD AUTO: 27.9 % (ref 37.5–51)
HGB BLD-MCNC: 8.9 G/DL (ref 13–17.7)
LYMPHOCYTES # BLD AUTO: 0.9 10*3/MM3 (ref 0.7–3.1)
LYMPHOCYTES NFR BLD AUTO: 9.2 % (ref 19.6–45.3)
MAGNESIUM SERPL-MCNC: 2.4 MG/DL (ref 1.6–2.4)
MCH RBC QN AUTO: 28 PG (ref 26.6–33)
MCHC RBC AUTO-ENTMCNC: 31.9 G/DL (ref 31.5–35.7)
MCV RBC AUTO: 87.7 FL (ref 79–97)
MONOCYTES # BLD AUTO: 0.7 10*3/MM3 (ref 0.1–0.9)
MONOCYTES NFR BLD AUTO: 6.7 % (ref 5–12)
NEUTROPHILS NFR BLD AUTO: 8.5 10*3/MM3 (ref 1.7–7)
NEUTROPHILS NFR BLD AUTO: 83.5 % (ref 42.7–76)
NRBC BLD AUTO-RTO: 0.2 /100 WBC (ref 0–0.2)
PLATELET # BLD AUTO: 253 10*3/MM3 (ref 140–450)
PMV BLD AUTO: 8.7 FL (ref 6–12)
POTASSIUM SERPL-SCNC: 4.2 MMOL/L (ref 3.5–5.2)
QT INTERVAL: 389 MS
QTC INTERVAL: 504 MS
RBC # BLD AUTO: 3.18 10*6/MM3 (ref 4.14–5.8)
SINUS: 3 CM
SODIUM SERPL-SCNC: 150 MMOL/L (ref 136–145)
TROPONIN T SERPL HS-MCNC: 296 NG/L
WBC NRBC COR # BLD AUTO: 10.1 10*3/MM3 (ref 3.4–10.8)

## 2024-01-22 PROCEDURE — 93308 TTE F-UP OR LMTD: CPT

## 2024-01-22 PROCEDURE — 85025 COMPLETE CBC W/AUTO DIFF WBC: CPT | Performed by: NURSE PRACTITIONER

## 2024-01-22 PROCEDURE — 84484 ASSAY OF TROPONIN QUANT: CPT | Performed by: INTERNAL MEDICINE

## 2024-01-22 PROCEDURE — 25010000002 ALBUMIN HUMAN 25% PER 50 ML

## 2024-01-22 PROCEDURE — 83735 ASSAY OF MAGNESIUM: CPT | Performed by: NURSE PRACTITIONER

## 2024-01-22 PROCEDURE — 82550 ASSAY OF CK (CPK): CPT | Performed by: INTERNAL MEDICINE

## 2024-01-22 PROCEDURE — 93010 ELECTROCARDIOGRAM REPORT: CPT | Performed by: INTERNAL MEDICINE

## 2024-01-22 PROCEDURE — 93005 ELECTROCARDIOGRAM TRACING: CPT | Performed by: NURSE PRACTITIONER

## 2024-01-22 PROCEDURE — 25010000002 CEFTRIAXONE PER 250 MG: Performed by: INTERNAL MEDICINE

## 2024-01-22 PROCEDURE — 99221 1ST HOSP IP/OBS SF/LOW 40: CPT | Performed by: NURSE PRACTITIONER

## 2024-01-22 PROCEDURE — 93321 DOPPLER ECHO F-UP/LMTD STD: CPT | Performed by: INTERNAL MEDICINE

## 2024-01-22 PROCEDURE — 82948 REAGENT STRIP/BLOOD GLUCOSE: CPT

## 2024-01-22 PROCEDURE — 93308 TTE F-UP OR LMTD: CPT | Performed by: INTERNAL MEDICINE

## 2024-01-22 PROCEDURE — 25010000002 HEPARIN (PORCINE) PER 1000 UNITS

## 2024-01-22 PROCEDURE — P9047 ALBUMIN (HUMAN), 25%, 50ML: HCPCS

## 2024-01-22 PROCEDURE — 82948 REAGENT STRIP/BLOOD GLUCOSE: CPT | Performed by: HOSPITALIST

## 2024-01-22 PROCEDURE — 80048 BASIC METABOLIC PNL TOTAL CA: CPT | Performed by: INTERNAL MEDICINE

## 2024-01-22 PROCEDURE — 93325 DOPPLER ECHO COLOR FLOW MAPG: CPT | Performed by: INTERNAL MEDICINE

## 2024-01-22 PROCEDURE — 93321 DOPPLER ECHO F-UP/LMTD STD: CPT

## 2024-01-22 PROCEDURE — 93325 DOPPLER ECHO COLOR FLOW MAPG: CPT

## 2024-01-22 PROCEDURE — 0 DEXTROSE 5 % SOLUTION: Performed by: NURSE PRACTITIONER

## 2024-01-22 PROCEDURE — 25810000003 LACTATED RINGERS SOLUTION

## 2024-01-22 RX ORDER — ALBUMIN (HUMAN) 12.5 G/50ML
50 SOLUTION INTRAVENOUS ONCE
Status: COMPLETED | OUTPATIENT
Start: 2024-01-22 | End: 2024-01-22

## 2024-01-22 RX ORDER — DEXTROSE AND SODIUM CHLORIDE 5; .45 G/100ML; G/100ML
100 INJECTION, SOLUTION INTRAVENOUS CONTINUOUS
Status: DISCONTINUED | OUTPATIENT
Start: 2024-01-22 | End: 2024-01-26 | Stop reason: HOSPADM

## 2024-01-22 RX ADMIN — PANTOPRAZOLE SODIUM 40 MG: 40 INJECTION, POWDER, FOR SOLUTION INTRAVENOUS at 06:00

## 2024-01-22 RX ADMIN — CEFTRIAXONE 1000 MG: 1 INJECTION, POWDER, FOR SOLUTION INTRAMUSCULAR; INTRAVENOUS at 09:59

## 2024-01-22 RX ADMIN — ALBUMIN (HUMAN) 50 G: 0.25 INJECTION, SOLUTION INTRAVENOUS at 01:56

## 2024-01-22 RX ADMIN — HEPARIN SODIUM 5000 UNITS: 5000 INJECTION, SOLUTION INTRAVENOUS; SUBCUTANEOUS at 22:50

## 2024-01-22 RX ADMIN — DEXTROSE AND SODIUM CHLORIDE 125 ML/HR: 5; 450 INJECTION, SOLUTION INTRAVENOUS at 18:40

## 2024-01-22 RX ADMIN — HEPARIN SODIUM 5000 UNITS: 5000 INJECTION, SOLUTION INTRAVENOUS; SUBCUTANEOUS at 06:00

## 2024-01-22 RX ADMIN — DEXTROSE MONOHYDRATE 100 ML/HR: 50 INJECTION, SOLUTION INTRAVENOUS at 07:50

## 2024-01-22 RX ADMIN — Medication 10 ML: at 20:37

## 2024-01-22 RX ADMIN — SODIUM CHLORIDE, POTASSIUM CHLORIDE, SODIUM LACTATE AND CALCIUM CHLORIDE 1000 ML: 600; 310; 30; 20 INJECTION, SOLUTION INTRAVENOUS at 00:33

## 2024-01-22 RX ADMIN — DEXTROSE AND SODIUM CHLORIDE 125 ML/HR: 5; 450 INJECTION, SOLUTION INTRAVENOUS at 09:58

## 2024-01-22 RX ADMIN — Medication 10 ML: at 08:05

## 2024-01-22 RX ADMIN — SODIUM CHLORIDE, POTASSIUM CHLORIDE, SODIUM LACTATE AND CALCIUM CHLORIDE 1000 ML: 600; 310; 30; 20 INJECTION, SOLUTION INTRAVENOUS at 02:43

## 2024-01-22 NOTE — PROGRESS NOTES
Had long discussion with family at bedside and went over comfort care vs continuing course of treatment. For now family would like to continue current course and see how he does as they feel he is waking up a little bit more than before.

## 2024-01-22 NOTE — NURSING NOTE
Pt arrived to unit at 1930. Pt was made DNR/DNI by spouse at bedside. Pt has not been alert and only withdraws from pain. Pt has had a few runs of vtach lasting approx 4-5 beats. Pt has received 50g albumin and 2L LR due to soft pressures. Pt on 2L NC. VSS at this time. Continuing to monitor.

## 2024-01-22 NOTE — NURSING NOTE
88-year-old male presents from his ECF with altered mental status changes.  Patient has a history of dementia.  A consult was received to assess the patient sacral buttocks area.  Patient does not respond to questions.    Patient turned and positioned.  Patient does have a small stage II to the right buttock 1 x 1 cm.  No exudates noted is partially epithelialized in bed.  Continue with a silicone foam border dressing.  Continue with pressure injury prevention strategies.

## 2024-01-22 NOTE — ED NOTES
Pt returned to supine position, propped with pillows. Pt had smear of brown BM. Inés care performed, brief removed, chux pads applied. Pt in no apparent distress.

## 2024-01-22 NOTE — CONSULTS
Nutrition Services    Patient Name: Cookie Larsen  YOB: 1935  MRN: [id number]  Admission date: 1/21/2024    Comment:  -- RD to monitor for nutrition plan of care    -- Severe chronic disease related malnutrition related to multiple chronic disease conditions as evidenced by severe fat/muscle loss per physical exam.  See MSA below.        CLINICAL NUTRITION ASSESSMENT      Reason for Assessment 1/22: Consult for Malnutrition Severity Assessment, MST of 3, Wound     H&P      Past Medical History:   Diagnosis Date    Depression     GONZALES (dyspnea on exertion)     Glaucoma     Gout     Hypertension     IFG (impaired fasting glucose)     NICM (nonischemic cardiomyopathy)     Vitamin D deficiency        Past Surgical History:   Procedure Laterality Date    CARDIAC CATHETERIZATION N/A 3/13/2019    Procedure: Coronary angiography;  Surgeon: Jovita Crews MD;  Location:  [facility] CATH INVASIVE [facility]ATION;  Service: Cardiology    CARDIAC CATHETERIZATION N/A 3/13/2019    Procedure: Left Heart Cath;  Surgeon: Jovita Crews MD;  Location:  [facility];  Service: Cardiology    CARDIAC CATHETERIZATION N/A 3/13/2019    Procedure: Left ventriculography;  Surgeon: Jovita Crews MD;  Location:  [facility] INVAS[facility];  Service: Cardiology    EAR MASTOIDECTOMY W/ COCHLEAR IMPLANT W/ LANDMARK Right     HEMORRHOIDECTOMY          Current Problems   Severe Sepsis     Metabolic encephalopathy     Hypernatremia      Acute kidney injury     Hyperkalemia     Hypoglycemia  - resolved     Severe malnutrition  - See MSA 1/22/24      Chronic:  Diastolic heart failure   Essential hypertension   Depression/anxiety       Encounter Information        Trending Narrative     1/22: Admitted for AMS and diagnosed with severe sepsis.  Patient discussed in AM rounds.  Per RN, wife does not desire tube feeding or intubation.  Palliative to see.  Patient [place] per RN.  Plans to transfer out to U level of care.  RD visited  "patient at bedside.  NFPE completed, consistent with nutrition diagnosis of malnutrition using AND/ASPEN criteria. See MSA below.         Anthropometrics        Current Height, Weight Height: 185.4 cm (73\")  Weight: 71.7 kg (158 lb) (01/22/24 0818)       Usual Body Weight (UBW) Unable to obtain from patient        Trending Weight Hx     This admission: 1/22: 158#             PTA: No recent weights to note     Wt Readings from Last 30 Encounters:   01/22/24 0818 71.7 kg (158 lb)   01/22/24 0600 71.8 kg (158 lb 4.6 oz)   01/21/24 2000 69.7 kg (153 lb 10.6 oz)   01/21/24 1553 75.8 kg (167 lb)   01/21/24 1423 98 kg (216 lb)   08/23/21 1149 98.2 kg (216 lb 9.6 oz)   08/19/21 1128 98.4 kg (217 lb)   01/22/21 1147 99.3 kg (219 lb)   07/21/20 1038 102 kg (224 lb 9.6 oz)   01/14/20 1007 98.9 kg (218 lb)   07/09/19 1102 99.3 kg (219 lb)   04/01/19 1137 98 kg (216 lb)   03/13/19 0858 98.9 kg (218 lb)   03/08/19 1010 98 kg (216 lb)      BMI kg/m2 Body mass index is 20.85 kg/m².       Labs        Pertinent Labs    Results from last 7 days   Lab Units 01/22/24  0712 01/21/24  2314 01/21/24  1859 01/21/24  1627   SODIUM mmol/L 150* 160*  --  161*   POTASSIUM mmol/L 4.2 5.5* 5.4* 6.1*   CHLORIDE mmol/L 118* 128*  --  124*   CO2 mmol/L 19.0* 20.0*  --  23.0   BUN mg/dL 113* 138*  --  150*   CREATININE mg/dL 2.72* 3.64*  --  4.21*   CALCIUM mg/dL 8.3* 8.7  --  9.0   BILIRUBIN mg/dL  --   --   --  0.4   ALK PHOS U/L  --   --   --  76   ALT (SGPT) U/L  --   --   --  28   AST (SGOT) U/L  --   --   --  33   GLUCOSE mg/dL 325* 127*  --  128*     Results from last 7 days   Lab Units 01/22/24  0712 01/21/24  1627   MAGNESIUM mg/dL 2.4 2.9*   PHOSPHORUS mg/dL  --  5.3*   HEMOGLOBIN g/dL 8.9*  --    HEMATOCRIT % 27.9*  --      Lab Results   Component Value Date    HGBA1C 5.8 (H) 04/16/2023        Medications    Scheduled Medications cefepime, 2,000 mg, Intravenous, Q24H  heparin (porcine), 5,000 Units, Subcutaneous, Q8H  pantoprazole, 40 mg, " Intravenous, Q AM  sodium chloride, 10 mL, Intravenous, Q12H        Infusions dextrose, 100 mL/hr, Last Rate: 100 mL/hr (01/22/24 0750)  Pharmacy to Dose Cefepime,         PRN Medications   senna-docusate sodium **AND** polyethylene glycol **AND** bisacodyl **AND** bisacodyl    dextrose    dextrose    glucagon (human recombinant)    nitroglycerin    ondansetron ODT **OR** ondansetron    Pharmacy to Dose Cefepime    [COMPLETED] Insert Peripheral IV **AND** sodium chloride    sodium chloride    sodium chloride     Physical Findings        Trending Physical   Appearance, NFPE 1/22: NFPE completed, consistent with nutrition diagnosis of malnutrition using AND/ASPEN criteria. See MSA below.      --  Edema  2+ right hand  1+ feet      Bowel Function Last documented BM 1/22 (today)     Tubes No feeding tube      Chewing/Swallowing Unknown baseline      Skin Coccyx wound - no WOCN note yet     --  Estimated/Assessed Needs    Estimated 1/22/24   Energy Requirements    EST Needs, Method, Wt used 0027-2984 kcal/day (30-40 kcal/kg of CBW 71.7 kg)       Protein Requirements    EST Needs, Method, Wt used  g/day (1.2-1.5 g/kg of CBW 71.7 kg)       Fluid Requirements     Estimated Needs (mL/day) 3579-4891 mL, heart failure      Fluid Deficit    Current Na Level (mEq/L) 150   Desired Na Level (mEq/L) 142-144   Estimated Fluid Deficit (L)  1.5-2.0 L     Current Nutrition Orders & Evaluation of Intake       Oral Nutrition     Food Allergies NKFA   Current PO Diet NPO Diet NPO Type: Strict NPO   Supplement None ordered    PO Evaluation     Trending % PO Intake 1/22: NPO   --  Enteral Nutrition    Enteral Route    Order, Modulars, Flushes    Residual/Tolerance    TF Observation         Parenteral Nutrition     TPN Route    Total # Days on TPN    TPN Order, Lipid Details    MVI & Trace Element Freq    TPN Observation         Nutrition Course Details    PO Diets: NPO since admission    Nutrition Support: RD to monitor for need for  nutrition support      Nutritional Risk Screening        NRS-2002 Score          Nutrition Diagnosis         Nutrition Dx Problem 1 Inadequate energy intake related to clinical course as evidenced by NPO.      Nutrition Dx Problem 2 Severe chronic disease related malnutrition related to multiple chronic disease conditions as evidenced by severe fat/muscle loss per physical exam.         Intervention Goal         Intervention Goal(s) Begin nutrition when able   Stable wt     Nutrition Intervention        RD Action Monitor for ability to begin nutrition   Completed NFPE     Nutrition Prescription          Diet Prescription NPO   Supplement Prescription    --  Enteral Prescription Initial Goal:  *initial goal conservative d/t risk of RFS     Diabetisource AC at 20mL/hr + 75mL/hr water flush      End Goal:    Diabetisource AC at 80 mL/hr + water flush per clinical picture      Calories  2112 kcals (98% lower end)    Protein  106 g (98% upper end)    Free water  1443 mL   Flushes  Will monitor hydration status      The above end goal rate is for 22 hrs/day to assume interruptions for ADLs. Water flushes adjusted based on clinical picture + Rx flushes/IV fluids     Specialized formula chosen r/t hyperglycemia.          TPN Prescription      Monitor/Evaluation        Monitor Per protocol, I&O, Pertinent labs, Weight, Skin status, GI status, Symptoms, POC/GOC     Malnutrition Severity Assessment      Patient meets criteria for : Severe Malnutrition  Malnutrition Type (last 8 hours)       Malnutrition Severity Assessment       Row Name 01/22/24 1225       Malnutrition Severity Assessment    Malnutrition Type Chronic Disease - Related Malnutrition      Row Name 01/22/24 1225       Muscle Loss    Loss of Muscle Mass Findings Severe    Sikhism Region Severe - deep hollowing/scooping, lack of muscle to touch, facial bones well defined    Clavicle Bone Region Severe - protruding prominent bone    Acromion Bone Region Severe -  squared shoulders, bones, and acromion process protrusion prominent    Scapular Bone Region --  supine    Dorsal Hand Region Severe - prominent depression    Patellar Region Severe - prominent bone, square looking, very little muscle definition    Anterior Thigh Region Severe - line/depression along thigh, obviously thin    Posterior Calf Region Severe - thin with very little definition/firmness      Row Name 01/22/24 1225       Fat Loss    Subcutaneous Fat Loss Findings Severe    Orbital Region  Severe - pronounced hollowness/depression, dark circles, loose saggy skin    Upper Arm Region Severe - mostly skin, very little space between folds, fingers touch    Thoracic & Lumbar Region --  DUONG      Row Name 01/22/24 1225       Criteria Met (Must meet criteria for severity in at least 2 of these categories: M Wasting, Fat Loss, Fluid, Secondary Signs, Wt. Status, Intake)    Patient meets criteria for  Severe Malnutrition                     Electronically signed by:  Alla Bellamy RD  01/22/24 08:30 EST

## 2024-01-22 NOTE — CONSULTS
[facility]   Consult Note    Patient Name: Cookie Larsen  : 1935  MRN: [id number]  Primary Care Physician:  Giancarlo Hogan MD  Date of admission: 2024  Service Date and time: 24 14:43 EST  Subjective   Subjective     Chief Complaint: ams    HPI:    Cookie Larsen is a 88 y.o. male with PMH of CHF, HTN, depression, anxiety, alcohol abuse, presented to the hospital for altered mental status, and was admitted with a principal diagnosis of Severe sepsis.      Per patient's family, the patient has not been eating or drinking for about three days. Per ER documentation, nursing home reported a steady decline over the patient couple of weeks.      ED course: Patient given sepsis bolus plus an additional liter of IVF. Blood culture drawn. Patient was given a dose of cefepime. UA positive for leukocytes, negative for nitrates. , creatine 4.21, sodium elevated at 161, potassium elevated at 6.1.    Patient was treated for hyperkalemia, family has been called to come in today as patient is NPO and they do not want feeding tube. So discussion will be had for comfort care measures or not. Apparently patient is not at his baseline mentation still. There is no family at bedside currently but they do make decisions for him.     Review of Systems   Unable to assess 2/2 patients mental status.    Personal History     Past Medical History:   Diagnosis Date    Depression     GONZALES (dyspnea on exertion)     Glaucoma     Gout     Hypertension     IFG (impaired fasting glucose)     NICM (nonischemic cardiomyopathy)     Vitamin D deficiency        Past Surgical History:   Procedure Laterality Date    CARDIAC CATHETERIZATION N/A 3/13/2019    Procedure: Coronary angiography;  Surgeon: Jovita Crews MD;  Location: [facility] CATH INVASIVE LOCATION;  Service: Cardiology    CARDIAC CATHETERIZATION N/A 3/13/2019    Procedure: Left Heart Cath;  Surgeon: Jovita Crews MD;  Location: [facility] CATH INVASIVE  LOCATION;  Service: Cardiology    CARDIAC CATHETERIZATION N/A 3/13/2019    Procedure: Left ventriculography;  Surgeon: Jovita Crews MD;  Location: [facility] INVASIVE LOCATION;  Service: Cardiology    EAR MASTOIDECTOMY W/ COCHLEAR IMPLANT W/ LANDMARK Right     HEMORRHOIDECTOMY         Family History: family history is not on file. Otherwise pertinent FHx was reviewed and not pertinent to current issue.    Social History:  reports that he has never smoked. He has never used smokeless tobacco. He reports current alcohol use. He reports that he does not use drugs.    Home Medications:  LORazepam, allopurinol, citalopram, divalproex, docusate sodium, famotidine, latanoprost, losartan, mirtazapine, risperiDONE, tamsulosin, and traMADol      Allergies:  No Known Allergies    Objective   Objective     Vitals:   Temp:  [95.7 °F (35.4 °C)-98.6 °F (37 °C)] 98.6 °F (37 °C)  Heart Rate:  [] 97  BP: ()/() 119/64  Flow (L/min):  [2] 2  Physical Exam    Constitutional: chronically ill appearing, lethargic   Eyes: sclerae anicteric, no conjunctival injection   HENT: NCAT, mucous membranes dry   Neck: Supple, no thyromegaly, no lymphadenopathy, trachea midline   Respiratory: Clear to auscultation bilaterally, nonlabored respirations    Cardiovascular: RRR, no murmurs, rubs, or gallops, palpable pedal pulses bilaterally   Gastrointestinal: Positive bowel sounds, soft, nontender, nondistended   Musculoskeletal: No bilateral ankle edema, no clubbing or cyanosis to extremities   Psychiatric: unable to assess   Neurologic: disoriented   Skin: No rashes     Result Review    Result Review:  I have personally reviewed the results from the time of this admission to 1/22/2024 14:43 EST and agree with these findings:  [x]  Laboratory list / accordion  [x]  Microbiology  [x]  Radiology  [x]  EKG/Telemetry   [x]  Cardiology/Vascular   [x]  Pathology  [x]  Old records  []  Other:      Assessment & Plan   Assessment / Plan        Active Hospital Problems:  Active Hospital Problems    Diagnosis     **Severe sepsis     Depressive disorder     Essential (primary) hypertension      Plan:   Severe Sepsis: ( WBC>12 or <4 or >10% bands, Temp > 100.4 or < 96.8, HR>90, Lactic acid>2, and Creatinine >2 )  Likely due to urosepsis.   F/u blood / urine cultures.   Cont IV abx  In ER patient given total of 3,274 ml per sepsis + 1L   Cefepime and Van x 1 also given in ER  Per chart review, last echo on 2/27/19 EF 47% with diastolic dysfunction. Cautious additional fluid resuscitation as needed.   Initial lactic 3.9 --> 5.5, Procal 0.35     Metabolic encephalopathy  CT head -- global atrophy, negative for any acute findings  Monitor neuro status per ICU protocol      Hypernatremia   Likely from insensible losses + decreased oral intake  Free Water Deficit in Hypernatremia - MDCalc  Calculated on Jan 21 2024 8:28 PM  5.7 L -> Free Water Deficit  D5W at 100 cc/ml   Recheck BMP     Acute kidney injury  Likely 2/2 dehydration  Baseline creatine 1.1  IVF for fluid resuscitation  Blood pressure currently stable  Monitor Is/Os  Monitor electrolytes  Net IO Since Admission: 150 mL [01/22/24 0117]     Hyperkalemia  Patient given half amp of D50 + insulin 5 units + 1g Calcium   Recheck potassium 5.4  Repeat BMP      Hypoglycemia  D5W at 100 cc/ml  Monitor glucose q2h until stable     Severe malnutrition  Inpatient nutrition consult  Family deciding about feeding tube placement vs comfort care, family meeting later today     Chronic:  Diastolic heart failure -- Echo pending for in AM. Currently not in exacerbation.   Essential hypertension -- Hold home losartan for now due to NPO and normotensive  Depression/anxiety -- Continue home celexa, ativan, remeron, risperdone, when clinically appropriate     Code Status (Patient has no pulse and is not breathing): CPR (Attempt to Resuscitate)  Medical Interventions (Patient has pulse or is breathing): Full  Support    DVT prophylaxis:  Medical and mechanical DVT prophylaxis orders are present.    CODE STATUS:    Medical Intervention Limits: NO intubation (DNI)  Code Status (Patient has no pulse and is not breathing): No CPR (Do Not Attempt to Resuscitate)  Medical Interventions (Patient has pulse or is breathing): Limited Support  Comments: Discussed with patient, wife, daughter, and son      Mayur Bull MD

## 2024-01-22 NOTE — CONSULTS
Palliative Care Consultation    Patient Name: Cookie Larsen  : 1935  MRN: [id number]  Allergies: Patient has no known allergies.    Requesting clinician:  Ezekiel   Reason for consult: Consultation for clarification of goals of care and code status.      Patient Code Status:   Code Status and Medical Interventions:   Ordered at: 24     Medical Intervention Limits:    NO intubation (DNI)     Code Status (Patient has no pulse and is not breathing):    No CPR (Do Not Attempt to Resuscitate)     Medical Interventions (Patient has pulse or is breathing):    Limited Support     Comments:    Discussed with patient, wife, daughter, and son           Chief Complaint:    Altered mental status     History of Present Illness    Cookie Larsen is a 88 y.o. male who presented to [facility] ED on  from [facility] with reports of altered mental status. Patient has baseline dementia, minimally able to participate in conversation. Per staff, patient has not been eating or drinking. Patient also weak and sleeping more.     In ED: Glucose 128, Creatinine 4.21, Albumin 3.0, Troponin 347, Creatinine kinase 812, WBC 13, Hgb 12, Platelets 374, Lactate 3.9    UA with leuks, protein, ketones    CT Head Without Contrast   Result Date: 2024  Impression: 1.Global atrophy. 2.White matter changes which could reflect more chronic small vessel ischemic change 3.Evidence for previous mastoid surgery 4.Suggested cochlear implant.     Cultures obtained. Started on IV antibiotics. Admitted to ICU.     Palliative consult for goals of care discussion in an elderly dementia patient.     VITAL SIGNS:   Temp:  [95.7 °F (35.4 °C)-98.4 °F (36.9 °C)] 98.4 °F (36.9 °C)  Heart Rate:  [] 98  Resp:  [20] 20  BP: ()/() 121/70       PMH: Depression, Dementia, Gout, HTN    Past Surgical History:   Procedure Laterality Date    CARDIAC CATHETERIZATION N/A 3/13/2019    Procedure: Coronary angiography;  Surgeon: Jovita Crews MD;   Location:  [facility] CATH INVASIVE LOCATION;  Service: Cardiology    CARDIAC CATHETERIZATION N/A 3/13/2019    Procedure: Left Heart Cath;  Surgeon: Jovita Crews MD;  Location:  [facility] CATH INVASIVE LOCATION;  Service: Cardiology    CARDIAC CATHETERIZATION N/A 3/13/2019    Procedure: Left ventriculography;  Surgeon: Jovita Crews MD;  Location:  [facility] CATH INVASIVE LOCATION;  Service: Cardiology    EAR MASTOIDECTOMY W/ COCHLEAR IMPLANT W/ LANDMARK Right     HEMORRHOIDECTOMY         History reviewed. No pertinent family history.    Social History     Tobacco Use    Smoking status: Never    Smokeless tobacco: Never    Tobacco comments:     No Caffeine use   Vaping Use    Vaping Use: Never used   Substance Use Topics    Alcohol use: Yes     Comment: 1-2 daily, caffiene: iced tea 6-8oz daily     Drug use: No           LABS:    Results from last 7 days   Lab Units 01/22/24  0712   WBC 10*3/mm3 10.10   HEMOGLOBIN g/dL 8.9*   HEMATOCRIT % 27.9*   PLATELETS 10*3/mm3 253     Results from last 7 days   Lab Units 01/22/24  0712   SODIUM mmol/L 150*   POTASSIUM mmol/L 4.2   CHLORIDE mmol/L 118*   CO2 mmol/L 19.0*   BUN mg/dL 113*   CREATININE mg/dL 2.72*   GLUCOSE mg/dL 325*   CALCIUM mg/dL 8.3*     Results from last 7 days   Lab Units 01/22/24  0712 01/21/24  1859 01/21/24  1627   SODIUM mmol/L 150*   < > 161*   POTASSIUM mmol/L 4.2   < > 6.1*   CHLORIDE mmol/L 118*   < > 124*   CO2 mmol/L 19.0*   < > 23.0   BUN mg/dL 113*   < > 150*   CREATININE mg/dL 2.72*   < > 4.21*   CALCIUM mg/dL 8.3*   < > 9.0   BILIRUBIN mg/dL  --   --  0.4   ALK PHOS U/L  --   --  76   ALT (SGPT) U/L  --   --  28   AST (SGOT) U/L  --   --  33   GLUCOSE mg/dL 325*   < > 128*    < > = values in this interval not displayed.         IMAGING STUDIES:  CT Head Without Contrast    Result Date: 1/21/2024  Impression: 1.Global atrophy. 2.White matter changes which could reflect more chronic small vessel ischemic change 3.Evidence for previous mastoid surgery  4.Suggested cochlear implant. Electronically Signed: Salazar Flores MD  1/21/2024 6:05 PM EST  Workstation ID: [id number]    XR Chest 1 View    Result Date: 1/21/2024  Impression: 1.An acute pulmonary process is not apparent. Electronically Signed: Salazar Flores MD  1/21/2024 3:36 PM EST  Workstation ID: [id number]       I reviewed the patient's new clinical results including labs, imaging, and vitals.        Scheduled Meds:  cefTRIAXone, 1,000 mg, Intravenous, Q24H  heparin (porcine), 5,000 Units, Subcutaneous, Q8H  pantoprazole, 40 mg, Intravenous, Q AM  sodium chloride, 10 mL, Intravenous, Q12H      Continuous Infusions:  dextrose 5 % and sodium chloride 0.45 %, 125 mL/hr, Last Rate: 125 mL/hr (01/22/24 0958)        I have reviewed patient's current medication list.     Review of Systems   Unable to perform ROS: Mental status change   All other systems reviewed and are negative.        Physical Exam  Vitals and nursing note reviewed.   Constitutional:       Appearance: He is ill-appearing.      Comments: frail   HENT:      Head: Normocephalic and atraumatic.      Nose: Nose normal.      Mouth/Throat:      Mouth: Mucous membranes are dry.      Pharynx: Oropharynx is clear.   Eyes:      Conjunctiva/sclera: Conjunctivae normal.   Cardiovascular:      Rate and Rhythm: Tachycardia present.      Pulses: Normal pulses.   Pulmonary:      Effort: Pulmonary effort is normal.   Abdominal:      General: Abdomen is flat.   Musculoskeletal:         General: Normal range of motion.      Cervical back: Normal range of motion.   Skin:     General: Skin is dry.   Neurological:      Mental Status: He is disoriented.             PROBLEM LIST:    Severe sepsis    Depressive disorder    Essential (primary) hypertension          ASSESSMENT/PLAN:    Sepsis: secondary to UTI. Cultures obtained. Started on IV antibiotics.     Metabolic encephalopathy: CT head with no acute findings but global atrophy.     Hypernatremia: decrease oral intake. On  D5W. BMP per primary.     JACQUI: likely secondary to dehydration. On IV fluids.     CHF/HTN/Depression/Dementia: chronic conditions per primary.     These illnesses and their management contribute to the need for a palliative consult and advanced care planning.      ADVANCED CARE PLANNIN/22 Met with patient at bedside. He is not alert and oriented, unable to participate in conversation at time of round. Family has elected to make patient a DNR/DNI. I attempted to reach wife this afternoon via phone but was unsuccessful. I will follow up.       Advanced Directives: Patient has advance directive, copy requested  Health Care Directive on file:    Health Care Surrogate:      Palliative Performance Scale Score:    Comments:           Decisional Capacity: no  Patient's understanding of illness: unknown  Patient goals of care:  DNR/DNI      Thank you for this consult and allowing us to participate in patient's plan of care. Palliative Care Team will continue to follow patient.       I spent 48 minutes reviewing providers documentation, medication records, assessing and examining patient, discussing with family, answering questions, providing guidance about a plan of care, and coordinating care with other healthcare members. More than 50% of time spent face to face discussing disease education, current clinical status, and medication management.         Priscila Sandoval, APRN  2024

## 2024-01-22 NOTE — CASE MANAGEMENT/SOCIAL WORK
Discharge Planning Assessment   Josse     Patient Name: Cookie Larsen  MRN: [id number]  Today's Date: 1/22/2024    Admit Date: 1/21/2024    Plan: Plan to return to [facility].   Discharge Needs Assessment       Row Name 01/22/24 1036       Living Environment    People in Home other (see comments)    [facility]    Current Living Arrangements extended care facility    Potentially Unsafe Housing Conditions none    In the past 12 months has the electric, gas, oil, or water company threatened to shut off services in your home? No    Provides Primary Care For no one    Family Caregiver if Needed other (see comments)    Quality of Family Relationships helpful;involved;supportive    Able to Return to Prior Arrangements yes       Resource/Environmental Concerns    Resource/Environmental Concerns none    Transportation Concerns none       Transportation Needs    In the past 12 months, has lack of transportation kept you from medical appointments or from getting medications? no    In the past 12 months, has lack of transportation kept you from meetings, work, or from getting things needed for daily living? No       Food Insecurity    Within the past 12 months, you worried that your food would run out before you got the money to buy more. Never true    Within the past 12 months, the food you bought just didn't last and you didn't have money to get more. Never true       Transition Planning    Patient/Family Anticipates Transition to long-term care facility    Patient/Family Anticipated Services at Transition none    Transportation Anticipated health plan transportation;family or friend will provide       Discharge Needs Assessment    Readmission Within the Last 30 Days no previous admission in last 30 days    Equipment Currently Used at Home wheelchair    Concerns to be Addressed discharge planning    Anticipated Changes Related to Illness none                   Discharge Plan        Row Name 01/22/24 1038       Plan    Plan Plan to return to [facility].    Plan Comments Patient is current resident at [facility] - ok to return per lialiang Whitt.  PCP and pharmacy confirmed.  Needs assist with ADLs.  PT/OT eval pending clinical course. Unable to reach wife by phone, # temp unavailable.  DC Barriers:  IV Abxs, cultures pending, abnormal labs, FC, D51/2NS, 2L O2 NC, cardiac monitoring, Palliative consulted.                  Continued Care and Services - Admitted Since 1/21/2024       Destination Coordination complete.      Service Provider Request Status Selected Services Address Phone Fax Patient Preferred    [facility]  Selected [facility] [address] [facility] [address] [phone] --                  Expected Discharge Date and Time       Expected Discharge Date Expected Discharge Time    Jan 25, 2024            Demographic Summary       Row Name 01/22/24 1032       General Information    Admission Type inpatient    Arrived From emergency department    Required Notices Provided Important Message from Medicare    Referral Source admission list    Reason for Consult discharge planning    Preferred Language English                   Functional Status       Row Name 01/22/24 1032       Functional Status    Usual Activity Tolerance poor    Current Activity Tolerance poor       Functional Status, IADL    Medications completely dependent    Meal Preparation completely dependent    Housekeeping completely dependent    Laundry completely dependent    Shopping completely dependent       Mental Status    General Appearance WDL WDL       Mental Status Summary    Recent Changes in Mental Status/Cognitive Functioning no changes                  Patient Forms       Row Name 01/22/24 1035       Patient Forms    Important Message from Medicare (IMM) Attempted  IMM attempted 1/22    Delivered to Support  person  wife    Method of delivery Telephone    Details of attempted delivery wife phone temp unavailable                      JACKIE Mckoy RN  SIPS/ICU   O: [phone]  C: [phone]  [email or web address]

## 2024-01-22 NOTE — PROGRESS NOTES
Critical Care Progress Note     Cookie Larsen : 1935 MRN:[id number] LOS:1  Rm: 2312/1     Principal Problem: Severe sepsis     Reason for follow up: All the medical problems listed below    Summary     Cookie Larsen is a 88 y.o. male with PMH of CHF, HTN, depression, anxiety, alcohol abuse, presented to the hospital for altered mental status, and was admitted with a principal diagnosis of Severe sepsis.      Per patient's family, the patient has not been eating or drinking for about three days. Per ER documentation, nursing home reported a steady decline over the patient couple of weeks.      ED course: Patient given sepsis bolus plus an additional liter of IVF. Blood culture drawn. Patient was given a dose of cefepime. UA positive for leukocytes, negative for nitrates. , creatine 4.21, sodium elevated at 161, potassium elevated at 6.1. He was admitted to the ICU with continued IVF resuscitation overnight and improvement in lab values.    Significant Events     24 : Hemodynamically stable, not requiring pressor support. Sodium improving but could benefit from continued volume support, IVF changed to D5 1/2NS. De-escalate abx. Stable for transfer out of the ICU     Assessment / Plan     Severe Sepsis: ( WBC>12 or <4 or >10% bands, Temp > 100.4 or < 96.8, HR>90, Lactic acid>2, and Creatinine >2 )  Likely due to urosepsis.   Follow blood culture / urine culture  Received cefepime, de-escalate to ceftriaxone monotherapy. Received vancomycin x 1 dose. MRSA negative.    In ER patient given total of 3,274 ml per sepsis + 1L   Per chart review, last echo on 19 EF 47% with diastolic dysfunction. Cautious additional fluid resuscitation as needed.   Initial lactic 3.9 --> 5.5, Procal 0.35     Acute encephalopathy / h/o dementia--likely exacerbated by sepsis  CT head -- global atrophy, negative for any acute findings     Hypernatremia   Likely from insensible losses + decreased oral intake  Slowly  improving with fluid resuscitation.  IVF changed to D5 1/2NS     Acute kidney injury  Likely 2/2 dehydration. Slowly improving with IVF resuscitation  Baseline creatine 1.1  Monitor Is/Os  Monitor electrolytes  Net IO Since Admission: 150 mL [01/22/24 0117]     Hyperkalemia-resolved     Hypoglycemia-resolved  Continue IVF  Accu cks with hypoglycemia protocol in place     Severe malnutrition  Inpatient nutrition consult  Family deciding about feeding tube placement  NPO pending SLP evaluation     Chronic:  Diastolic heart failure -- Currently compensated. Echo 1/22/23 EF 60%   Essential hypertension -- Hold home losartan for now due to NPO and normotensive  Depression/anxiety -- Continue home celexa, ativan, remeron, risperdone, when clinically appropriate--currently NPO       Disposition:  PCU    Code status:   Medical Intervention Limits: NO intubation (DNI)  Code Status (Patient has no pulse and is not breathing): No CPR (Do Not Attempt to Resuscitate)  Medical Interventions (Patient has pulse or is breathing): Limited Support  Comments: Discussed with patient, wife, daughter, and son       Nutrition:   NPO Diet NPO Type: Strict NPO   Patient isn't on Tube Feeding     DVT prophylaxis:  Medical and mechanical DVT prophylaxis orders are present.     Subjective / Review of systems     Review of Systems   Pt nonverbal and noncontributory with baseline of dementia  Objective / Physical Exam     Vital signs:  Temp: 98.6 °F (37 °C)  BP: 111/74  Heart Rate: 99  Resp: 20  SpO2: 98 %  Weight: 71.7 kg (158 lb)    Admission Weight: Weight: 98 kg (216 lb)  Current Weight: Weight: 71.7 kg (158 lb)    Input/Output in last 24 hours:    Intake/Output Summary (Last 24 hours) at 1/22/2024 1249  Last data filed at 1/22/2024 0330  Gross per 24 hour   Intake 150 ml   Output 400 ml   Net -250 ml      Net IO Since Admission: -250 mL [01/22/24 1249]     Physical Exam  Vitals and nursing note reviewed.   Constitutional:       General: He  is not in acute distress.     Appearance: He is ill-appearing. He is not diaphoretic.   HENT:      Head: Normocephalic and atraumatic.      Right Ear: External ear normal.      Left Ear: External ear normal.   Eyes:      General: No scleral icterus.     Conjunctiva/sclera: Conjunctivae normal.      Pupils: Pupils are equal, round, and reactive to light.   Cardiovascular:      Heart sounds: S1 normal and S2 normal. No murmur heard.     Comments: ST  Pulmonary:      Effort: Pulmonary effort is normal.      Breath sounds: Rhonchi (coarse, scattered) present. No wheezing.   Abdominal:      General: Bowel sounds are normal. There is no distension.      Palpations: Abdomen is soft.   Musculoskeletal:      Comments: Hands and feet with edema   Skin:     General: Skin is warm and dry.   Neurological:      Comments: Slight w/d from noxious stimuli          Radiology and Labs     Results from last 7 days   Lab Units 01/22/24  0712 01/21/24  1533   WBC 10*3/mm3 10.10 13.00*   HEMOGLOBIN g/dL 8.9* 12.0*   HEMATOCRIT % 27.9* 38.6   PLATELETS 10*3/mm3 253 374           Results from last 7 days   Lab Units 01/22/24  0712 01/21/24  2314 01/21/24  1859 01/21/24  1627   SODIUM mmol/L 150* 160*  --  161*   POTASSIUM mmol/L 4.2 5.5* 5.4* 6.1*   CHLORIDE mmol/L 118* 128*  --  124*   CO2 mmol/L 19.0* 20.0*  --  23.0   BUN mg/dL 113* 138*  --  150*   CREATININE mg/dL 2.72* 3.64*  --  4.21*   GLUCOSE mg/dL 325* 127*  --  128*   MAGNESIUM mg/dL 2.4  --   --  2.9*   PHOSPHORUS mg/dL  --   --   --  5.3*      Results from last 7 days   Lab Units 01/21/24  1627   ALK PHOS U/L 76   AST (SGOT) U/L 33   ALT (SGPT) U/L 28             Current medications     Scheduled Meds:   cefTRIAXone, 1,000 mg, Intravenous, Q24H  heparin (porcine), 5,000 Units, Subcutaneous, Q8H  pantoprazole, 40 mg, Intravenous, Q AM  sodium chloride, 10 mL, Intravenous, Q12H        Continuous Infusions:   dextrose 5 % and sodium chloride 0.45 %, 125 mL/hr, Last Rate: 125 mL/hr  (01/22/24 0958)          Plan discussed with RN. Reviewed all other data in the last 24 hours, including but not limited to vitals, labs, microbiology, imaging and pertinent notes from other providers.      Karen Omalley, APRN   Critical Care  01/22/24   12:49 EST

## 2024-01-22 NOTE — H&P
Critical Care History and Physical     Cookie Larsen : 1935 MRN:[id number] LOS:0 ROOM: [facility]/1     Reason for admission: Severe sepsis     Assessment / Plan     Severe Sepsis: ( WBC>12 or <4 or >10% bands, Temp > 100.4 or < 96.8, HR>90, Lactic acid>2, and Creatinine >2 )  Likely due to urosepsis.   Will do blood / urine cultures.   Started on cefepime and vancomycin x 1 dose. MRSA pending.    In ER patient given total of 3,274 ml per sepsis + 1L   Cefepime and Van x 1 also given in ER  Per chart review, last echo on 19 EF 47% with diastolic dysfunction. Cautious additional fluid resuscitation as needed.   Initial lactic 3.9 --> 5.5, Procal 0.35    Metabolic encephalopathy  CT head -- global atrophy, negative for any acute findings  Monitor neuro status per ICU protocol     Hypernatremia   Likely from insensible losses + decreased oral intake  Free Water Deficit in Hypernatremia - MDCalc  Calculated on 2024 8:28 PM  5.7 L -> Free Water Deficit  D5W at 100 cc/ml   Recheck BMP    Acute kidney injury  Likely 2/2 dehydration  Baseline creatine 1.1  IVF for fluid resuscitation  Blood pressure currently stable  Monitor Is/Os  Monitor electrolytes  Net IO Since Admission: 150 mL [24 0117]    Hyperkalemia  Patient given half amp of D50 + insulin 5 units + 1g Calcium   Recheck potassium 5.4  Repeat BMP     Hypoglycemia  D5W at 100 cc/ml  Monitor glucose q2h until stable    Severe malnutrition  Inpatient nutrition consult  Family deciding about feeding tube placement    Chronic:  Diastolic heart failure -- Echo pending for in AM. Currently not in exacerbation.   Essential hypertension -- Hold home losartan for now due to NPO and normotensive  Depression/anxiety -- Continue home celexa, ativan, remeron, risperdone, when clinically appropriate    Code Status (Patient has no pulse and is not breathing): CPR (Attempt to Resuscitate)  Medical Interventions (Patient has pulse or is breathing): Full  Support       Nutrition:   NPO Diet NPO Type: Strict NPO     DVT prophylaxis:  Mechanical DVT prophylaxis orders are present.     History of Present illness     Cookie Larsen is a 88 y.o. male with PMH of CHF, HTN, depression, anxiety, alcohol abuse, presented to the hospital for altered mental status, and was admitted with a principal diagnosis of Severe sepsis.     Per patient's family, the patient has not been eating or drinking for about three days. Per ER documentation, nursing home reported a steady decline over the patient couple of weeks.     ED course: Patient given sepsis bolus plus an additional liter of IVF. Blood culture drawn. Patient was given a dose of cefepime. UA positive for leukocytes, negative for nitrates. , creatine 4.21, sodium elevated at 161, potassium elevated at 6.1. Patient at cruz risk for decompensation due to severe sepsis. Will be monitored overnight in ICU.     ACP: No CPR, No intubation. Patient has no ACP docs on file. I discussed code status with patient's wife, daughter, and son at bedside. Wife is NOK to make decisions for patient.     Patient was seen and examined on 01/21/24 at 20:08 EST .    Subjective / Review of systems     Review of Systems   Unable to perform ROS: Mental status change        Past Medical/Surgical/Social/Family History & Allergies     Past Medical History:   Diagnosis Date    Depression     GONZALES (dyspnea on exertion)     Glaucoma     Gout     Hypertension     IFG (impaired fasting glucose)     NICM (nonischemic cardiomyopathy)     Vitamin D deficiency       Past Surgical History:   Procedure Laterality Date    CARDIAC CATHETERIZATION N/A 3/13/2019    Procedure: Coronary angiography;  Surgeon: Jovita Crews MD;  Location: [facility] INVASIVE LOCATION;  Service: Cardiology    CARDIAC CATHETERIZATION N/A 3/13/2019    Procedure: Left Heart Cath;  Surgeon: Jovita Crews MD;  Location: [facility] CATH INVASIVE LOCATION;  Service: Cardiology    CARDIAC  CATHETERIZATION N/A 3/13/2019    Procedure: Left ventriculography;  Surgeon: Jovita Crews MD;  Location: [facility] INVASIVE LOCATION;  Service: Cardiology    EAR MASTOIDECTOMY W/ COCHLEAR IMPLANT W/ LANDMARK Right     HEMORRHOIDECTOMY        Social History     Socioeconomic History    Marital status:    Tobacco Use    Smoking status: Never    Smokeless tobacco: Never    Tobacco comments:     No Caffeine use   Substance and Sexual Activity    Alcohol use: Yes     Comment: 1-2 daily, caffiene: iced tea 6-8oz daily     Drug use: No    Sexual activity: Defer      History reviewed. No pertinent family history.   No Known Allergies   Social Determinants of Health     Tobacco Use: Low Risk  (1/21/2024)    Patient History     Smoking Tobacco Use: Never     Smokeless Tobacco Use: Never     Passive Exposure: Not on file   Alcohol Use: Not on file   Financial Resource Strain: Not on file   Food Insecurity: Not on file   Transportation Needs: Not on file   Physical Activity: Not on file   Stress: Not on file   Social Connections: Unknown (10/10/2023)    Family and Community Support     Help with Day-to-Day Activities: Not on file     Lonely or Isolated: Not on file   Interpersonal Safety: Unknown (10/10/2023)    Abuse Screen     Unsafe at Home or Work/School: Not on file     Feels Threatened by Someone?: Not on file     Does Anyone Keep You from Contacting Others or Doint Things Outside the Home?: Not on file     Physical Sign of Abuse Present: Not on file   Depression: Not on file   Housing Stability: Unknown (10/10/2023)    Housing Stability     Current Living Arrangements: Not on file     Potentially Unsafe Housing Conditions: Not on file   Utilities: Not on file   Health Literacy: Unknown (10/10/2023)    Education     Help with school or training?: Not on file     Preferred Language: Not on file   Employment: Unknown (10/10/2023)    Employment     Do you want help finding or keeping work or a job?: Not on file    Disabilities: Unknown (10/10/2023)    Disabilities     Concentrating, Remembering, or Making Decisions Difficulty: Not on file     Doing Errands Independently Difficulty: Not on file        Home Medications     Prior to Admission medications    Medication Sig Start Date End Date Taking? Authorizing Provider   allopurinol (ZYLOPRIM) 300 MG tablet Take 1 tablet by mouth Daily.   Yes Christofer Mcneill MD   citalopram (CeleXA) 20 MG tablet Take 1 tablet by mouth Daily.   Yes Christofer Mcneill MD   divalproex (DEPAKOTE) 125 MG DR tablet Take 2 tablets by mouth 2 (Two) Times a Day.   Yes Christofer Mcneill MD   docusate sodium (COLACE) 100 MG capsule Take 1 capsule by mouth 2 (Two) Times a Day.   Yes Christofer Mcneill MD   famotidine (PEPCID) 10 MG tablet Take 1 tablet by mouth 2 (Two) Times a Day.   Yes Christofer Mcneill MD   latanoprost (XALATAN) 0.005 % ophthalmic solution Administer 1 drop to both eyes Every Night.   Yes Christofer Mcneill MD   LORazepam (ATIVAN) 0.5 MG tablet Take 1 tablet by mouth 2 (Two) Times a Day.   Yes Christofer Mcneill MD   losartan (COZAAR) 100 MG tablet Take 1 tablet by mouth Daily.   Yes Christofer Mcneill MD   mirtazapine (REMERON) 15 MG tablet Take 1 tablet by mouth Daily.   Yes Christofer Mcneill MD   risperiDONE (risperDAL) 0.25 MG tablet Take 1 tablet by mouth Every Night.   Yes Christofer Mcneill MD   tamsulosin (FLOMAX) 0.4 MG capsule 24 hr capsule Take 1 capsule by mouth Daily.   Yes Christofer Mcneill MD   traMADol (ULTRAM) 50 MG tablet Take 1 tablet by mouth 2 (Two) Times a Day.   Yes Christofer Mcneill MD   aspirin 81 MG EC tablet Take 81 mg by mouth Daily.  1/21/24  Christofer Mcneill MD   brimonidine (ALPHAGAN) 0.2 % ophthalmic solution Administer 1 drop to both eyes 3 (Three) Times a Day.  1/21/24  Christofer Mcneill MD   Diclofenac Sodium (VOLTAREN) 1 % gel gel diclofenac 1 % topical gel  1/21/24  Christofer Mcneill MD    fluticasone-salmeterol (ADVAIR HFA) 115-21 MCG/ACT inhaler Inhale 2 puffs 2 (Two) Times a Day.  1/21/24  Christofer Mcneill MD   irbesartan (AVAPRO) 150 MG tablet Take 150 mg by mouth Daily.  1/21/24  Christofer Mcneill MD   QUEtiapine (SEROquel) 25 MG tablet TK 2 TS PO QAM. MAY TAKE 1 T PO DURING THE DAY IF NEEDED FOR AGITATION OR ANXIETY 11/25/20 1/21/24  Christofer Mcneill MD   sertraline (ZOLOFT) 25 MG tablet Take 25 mg by mouth Daily. 2/25/19 1/21/24  Christofer Mcneill MD   sulfamethoxazole-trimethoprim (BACTRIM DS,SEPTRA DS) 800-160 MG per tablet sulfamethoxazole 800 mg-trimethoprim 160 mg tablet   TAKE 1 TABLET BY MOUTH EVERY 12 HOURS  1/21/24  Christofer Mcneill MD        Objective / Physical Exam     Vital signs:  Temp: 98.2 °F (36.8 °C)  BP: 95/47  Heart Rate: 95  Resp: 20  SpO2: 98 %  Weight:  (unable to assess- bed scale broken)    Admission Weight: Weight: 98 kg (216 lb)    Physical Exam  Vitals and nursing note reviewed.   Constitutional:       General: He is not in acute distress.     Appearance: He is ill-appearing.   HENT:      Head: Normocephalic.      Mouth/Throat:      Mouth: Mucous membranes are dry.      Pharynx: Oropharynx is clear.   Eyes:      Conjunctiva/sclera: Conjunctivae normal.      Pupils: Pupils are equal, round, and reactive to light.   Cardiovascular:      Rate and Rhythm: Regular rhythm. Tachycardia present.      Pulses: Normal pulses.      Heart sounds: Normal heart sounds.   Pulmonary:      Effort: Pulmonary effort is normal. No respiratory distress.      Breath sounds: Rhonchi present.   Abdominal:      General: Bowel sounds are normal.      Palpations: Abdomen is soft.   Musculoskeletal:      Right lower leg: No edema.      Left lower leg: No edema.   Skin:     General: Skin is warm and dry.      Findings: No rash.   Neurological:      General: No focal deficit present.      Mental Status: He is lethargic and disoriented.          Labs     Results from last 7  days   Lab Units 01/21/24  1533   WBC 10*3/mm3 13.00*   HEMATOCRIT % 38.6   PLATELETS 10*3/mm3 374      Results from last 7 days   Lab Units 01/21/24  1859 01/21/24  1627   SODIUM mmol/L  --  161*   POTASSIUM mmol/L 5.4* 6.1*   CHLORIDE mmol/L  --  124*   CO2 mmol/L  --  23.0   BUN mg/dL  --  150*   CREATININE mg/dL  --  4.21*        Imaging     CT Head Without Contrast    Result Date: 1/21/2024  Impression: 1.Global atrophy. 2.White matter changes which could reflect more chronic small vessel ischemic change 3.Evidence for previous mastoid surgery 4.Suggested cochlear implant. Electronically Signed: Salazar Flores MD  1/21/2024 6:05 PM EST  Workstation ID: [id number]    XR Chest 1 View    Result Date: 1/21/2024  Impression: 1.An acute pulmonary process is not apparent. Electronically Signed: Salazar Flores MD  1/21/2024 3:36 PM EST  Workstation ID: [id number]     Current Medications     Scheduled Meds:  [START ON 1/22/2024] cefepime, 2,000 mg, Intravenous, Q24H  [START ON 1/22/2024] pantoprazole, 40 mg, Intravenous, Q AM  sodium chloride, 10 mL, Intravenous, Q12H  vancomycin, 1,500 mg, Intravenous, Once         Continuous Infusions:  dextrose, 100 mL/hr, Last Rate: 100 mL/hr (01/21/24 1953)  Pharmacy to Dose Cefepime,            ALLISON Rueda   Critical Care  01/21/24   20:08 EST

## 2024-01-22 NOTE — PLAN OF CARE
Goal Outcome Evaluation:  Plan of Care Reviewed With: patient           Outcome Evaluation: pt unable to follow commands. goals of care discussed with family today. continue with D5W for BS. continue with BX.

## 2024-01-22 NOTE — ED NOTES
Nursing report ED to floor  Cookie Larsen  88 y.o.  male    HPI:   Chief Complaint   Patient presents with    Weakness - Generalized       Admitting doctor:   Max Falcon MD    Admitting diagnosis:   The primary encounter diagnosis was Septic shock. Diagnoses of Urinary tract infection without hematuria, site unspecified, Acute renal failure, unspecified acute renal failure type, Hypernatremia, and Hyperkalemia were also pertinent to this visit.    Code status:   Current Code Status       Date Active Code Status Order ID Comments User Context       1/21/2024 1910 CPR (Attempt to Resuscitate) [id number]  Karen Omalley G, APRN ED        Question Answer    Code Status (Patient has no pulse and is not breathing) CPR (Attempt to Resuscitate)    Medical Interventions (Patient has pulse or is breathing) Full Support                    Allergies:   Patient has no known allergies.    Isolation:  Enhanced Droplet/Contact      Fall Risk:  Fall Risk Assessment was completed, and patient is at high risk for falls.   Predictive Model Details         57 (High) Factor Value    Calculated 1/21/2024 19:16 Age 88    Risk of Fall Model Musculoskeletal Assessment WDL     Chloride 124 mmol/L     Active Peripheral IV Present     Imaging order in this encounter Present     Respiratory Rate 20     Diastolic BP 47     Magnesium 2.9 mg/dL     Number of Distinct Medication Classes administered 4     Financial Class Medicare     Albumin 3 g/dL     Drug Use No     Creatinine 4.21 mg/dL     Jan Scale not on file     Peripheral Vascular Assessment WDL     Calcium 9 mg/dL     Clinically Relevant Sex Not Female     Gastrointestinal Assessment WDL     Cardiac Assessment WDL     Potassium 6.1 mmol/L     ALT 28 U/L     Days after Admission 0.203     Skin Assessment X     Total Bilirubin 0.4 mg/dL         Weight:       01/21/24  1553   Weight: 75.8 kg (167 lb)       Intake and Output    Intake/Output Summary (Last 24 hours) at 1/21/2024  1917  Last data filed at 1/21/2024 1802  Gross per 24 hour   Intake 150 ml   Output --   Net 150 ml       Diet:   Dietary Orders (From admission, onward)       Start     Ordered    01/21/24 1909  NPO Diet NPO Type: Strict NPO  Diet Effective Now        Question:  NPO Type  Answer:  Strict NPO    01/21/24 1910                     Most recent vitals:   Vitals:    01/21/24 1846 01/21/24 1853 01/21/24 1901 01/21/24 1910   BP: (!) 86/52  95/47    Pulse: 109  101 95   Resp:       Temp:  97.6 °F (36.4 °C)     TempSrc:  Rectal     SpO2: 97%  95% 98%   Weight:       Height:           Active LDAs/IV Access:   Lines, Drains & Airways       Active LDAs       Name Placement date Placement time Site Days    Peripheral IV 01/21/24 1559 Anterior;Distal;Right;Upper Arm 01/21/24  1559  Arm  less than 1    Peripheral IV 01/21/24 1600 Anterior;Left Wrist 01/21/24  1600  Wrist  less than 1                    Skin Condition:   Skin Assessments (last day)       Date/Time Skin WDL    01/21/24 1453 X     Skin WDL: approximately quarter-sized pink ulcer noted to pt's left buttock. Mepilex sacral dressing applied, no drainage from wound. at 01/21/24 1453             Labs (abnormal labs have a star):   Labs Reviewed   COMPREHENSIVE METABOLIC PANEL - Abnormal; Notable for the following components:       Result Value    Glucose 128 (*)      (*)     Creatinine 4.21 (*)     Sodium 161 (*)     Potassium 6.1 (*)     Chloride 124 (*)     Albumin 3.0 (*)     BUN/Creatinine Ratio 35.6 (*)     eGFR 12.9 (*)     All other components within normal limits    Narrative:     GFR Normal >60  Chronic Kidney Disease <60  Kidney Failure <15    The GFR formula is only valid for adults with stable renal function between ages 18 and 70.   URINALYSIS W/ CULTURE IF INDICATED - Abnormal; Notable for the following components:    Color, UA Dark Yellow (*)     Appearance, UA Turbid (*)     Ketones, UA 15 mg/dL (1+) (*)     Protein, UA 30 mg/dL (1+) (*)     Leuk  Esterase, UA Moderate (2+) (*)     All other components within normal limits    Narrative:     In absence of clinical symptoms, the presence of pyuria, bacteria, and/or nitrites on the urinalysis result does not correlate with infection.   SINGLE HSTROPONIN T - Abnormal; Notable for the following components:    HS Troponin T 347 (*)     All other components within normal limits    Narrative:     High Sensitive Troponin T Reference Range:  <14.0 ng/L- Negative Female for AMI  <22.0 ng/L- Negative Male for AMI  >=14 - Abnormal Female indicating possible myocardial injury.  >=22 - Abnormal Male indicating possible myocardial injury.   Clinicians would have to utilize clinical acumen, EKG, Troponin, and serial changes to determine if it is an Acute Myocardial Infarction or myocardial injury due to an underlying chronic condition.        MAGNESIUM - Abnormal; Notable for the following components:    Magnesium 2.9 (*)     All other components within normal limits   PHOSPHORUS - Abnormal; Notable for the following components:    Phosphorus 5.3 (*)     All other components within normal limits   CK - Abnormal; Notable for the following components:    Creatine Kinase 812 (*)     All other components within normal limits   CBC WITH AUTO DIFFERENTIAL - Abnormal; Notable for the following components:    WBC 13.00 (*)     Hemoglobin 12.0 (*)     MCHC 31.0 (*)     RDW 18.8 (*)     RDW-SD 59.5 (*)     All other components within normal limits    Narrative:     The previously reported component NRBC is no longer being reported. Previous result was 0.5 /100 WBC (Reference Range: 0.0-0.2 /100 WBC) on 1/21/2024 at 1547 EST.   URINALYSIS, MICROSCOPIC ONLY - Abnormal; Notable for the following components:    WBC, UA 21-50 (*)     Bacteria, UA 4+ (*)     Squamous Epithelial Cells, UA 3-6 (*)     Transitional Epithelial Cells, UA 3-6 (*)     All other components within normal limits   MANUAL DIFFERENTIAL - Abnormal; Notable for the  following components:    Lymphocyte % 12.0 (*)     Bands %  7.0 (*)     Neutrophils Absolute 10.66 (*)     nRBC 1.0 (*)     All other components within normal limits   POC LACTATE - Abnormal; Notable for the following components:    Lactate 3.9 (*)     All other components within normal limits   POCT GLUCOSE FINGERSTICK - Abnormal; Notable for the following components:    Glucose 107 (*)     All other components within normal limits   POCT GLUCOSE FINGERSTICK - Abnormal; Notable for the following components:    Glucose 56 (*)     All other components within normal limits   RESPIRATORY PANEL PCR W/ COVID-19 (SARS-COV-2), NP SWAB IN UTM/VTP, 2 HR TAT   BLOOD CULTURE   BLOOD CULTURE   URINE CULTURE   MRSA SCREEN, PCR   STREP PNEUMO AG, URINE OR CSF   LEGIONELLA ANTIGEN, URINE   SCAN SLIDE   LACTIC ACID, REFLEX   HIGH SENSITIVITIY TROPONIN T 2HR   POTASSIUM   URINALYSIS W/ CULTURE IF INDICATED   POCT GLUCOSE FINGERSTICK   POCT GLUCOSE FINGERSTICK   POCT GLUCOSE FINGERSTICK   POCT GLUCOSE FINGERSTICK   CBC AND DIFFERENTIAL    Narrative:     The following orders were created for panel order CBC & Differential.  Procedure                               Abnormality         Status                     ---------                               -----------         ------                     CBC Auto Differential[[id number]]        Abnormal            Final result               Scan Slide[[id number]]                                       Final result                 Please view results for these tests on the individual orders.       LOC:  Withdraws to pain only, disoriented x4    Telemetry:  Critical Care    Cardiac Monitoring Ordered: yes    EKG:   ECG 12 Lead Altered Mental Status   Preliminary Result   HEART RATE= 100  bpm   RR Interval= 596  ms   NJ Interval= 61  ms   P Horizontal Axis= 31  deg   P Front Axis= 0  deg   QRSD Interval= 105  ms   QT Interval= 389  ms   QTcB= 504  ms   QRS Axis= 40  deg   T Wave Axis=   deg   -  ABNORMAL ECG -   Sinus tachycardia   Multiform ventricular premature complexes   Low voltage, extremity leads   Prolonged QT interval   No previous ECG available for comparison   Electronically Signed By:    Date and Time of Study: 2024-01-21 16:32:15          Medications Given in the ED:   Medications   sodium chloride 0.9 % flush 10 mL (has no administration in time range)   nitroglycerin (NITROSTAT) SL tablet 0.4 mg (has no administration in time range)   sodium chloride 0.9 % flush 10 mL (has no administration in time range)   sodium chloride 0.9 % flush 10 mL (has no administration in time range)   sodium chloride 0.9 % infusion 40 mL (has no administration in time range)   sennosides-docusate (PERICOLACE) 8.6-50 MG per tablet 2 tablet (has no administration in time range)     And   polyethylene glycol (MIRALAX) packet 17 g (has no administration in time range)     And   bisacodyl (DULCOLAX) EC tablet 5 mg (has no administration in time range)     And   bisacodyl (DULCOLAX) suppository 10 mg (has no administration in time range)   ondansetron ODT (ZOFRAN-ODT) disintegrating tablet 4 mg (has no administration in time range)     Or   ondansetron (ZOFRAN) injection 4 mg (has no administration in time range)   pantoprazole (PROTONIX) injection 40 mg (has no administration in time range)   dextrose (D5W) 5 % infusion (has no administration in time range)   Pharmacy to dose vancomycin (has no administration in time range)   cefepime 2 gm IVPB in 100 ml NS (MBP) (0 mg Intravenous Stopped 1/21/24 1649)   sepsis fluid NS 0.9 % bolus 2,274 mL (2,274 mL Intravenous New Bag 1/21/24 1559)   calcium gluconate 1000 Mg/50ml 0.675% NaCl IV SOLN (0 mg Intravenous Stopped 1/21/24 1802)   insulin regular (humuLIN R,novoLIN R) injection 5 Units (5 Units Intravenous Given 1/21/24 1719)   dextrose (D50W) (25 g/50 mL) IV injection 25 g (25 g Intravenous Given 1/21/24 1719)   sodium chloride 0.9 % bolus 1,000 mL (1,000 mL Intravenous  New Bag 1/21/24 1845)   dextrose (D50W) (25 g/50 mL) IV injection 25 g (25 g Intravenous Given 1/21/24 1904)       Imaging results:  CT Head Without Contrast    Result Date: 1/21/2024  Impression: 1.Global atrophy. 2.White matter changes which could reflect more chronic small vessel ischemic change 3.Evidence for previous mastoid surgery 4.Suggested cochlear implant. Electronically Signed: Salazar Flores MD  1/21/2024 6:05 PM EST  Workstation ID: [id number]    XR Chest 1 View    Result Date: 1/21/2024  Impression: 1.An acute pulmonary process is not apparent. Electronically Signed: Salazar Flores MD  1/21/2024 3:36 PM EST  Workstation ID: [id number]     Social issues:   Social History     Socioeconomic History    Marital status:    Tobacco Use    Smoking status: Never    Smokeless tobacco: Never    Tobacco comments:     No Caffeine use   Substance and Sexual Activity    Alcohol use: Yes     Comment: 1-2 daily, caffiene: iced tea 6-8oz daily     Drug use: No    Sexual activity: Defer       NIH Stroke Scale:  Interval: (not recorded)  1a. Level of Consciousness: (not recorded)  1b. LOC Questions: (not recorded)  1c. LOC Commands: (not recorded)  2. Best Gaze: (not recorded)  3. Visual: (not recorded)  4. Facial Palsy: (not recorded)  5a. Motor Arm, Left: (not recorded)  5b. Motor Arm, Right: (not recorded)  6a. Motor Leg, Left: (not recorded)  6b. Motor Leg, Right: (not recorded)  7. Limb Ataxia: (not recorded)  8. Sensory: (not recorded)  9. Best Language: (not recorded)  10. Dysarthria: (not recorded)  11. Extinction and Inattention (formerly Neglect): (not recorded)    Total (NIH Stroke Scale): (not recorded)     Additional notable assessment information:     Nursing report ED to floor:  SHEBA Walsh RN   01/21/24 19:17 EST

## 2024-01-23 PROBLEM — E43 SEVERE MALNUTRITION: Status: ACTIVE | Noted: 2024-01-23

## 2024-01-23 LAB
ANION GAP SERPL CALCULATED.3IONS-SCNC: 9 MMOL/L (ref 5–15)
BACTERIA SPEC AEROBE CULT: ABNORMAL
BACTERIA SPEC AEROBE CULT: NORMAL
BASOPHILS # BLD AUTO: 0 10*3/MM3 (ref 0–0.2)
BASOPHILS NFR BLD AUTO: 0.3 % (ref 0–1.5)
BUN SERPL-MCNC: 76 MG/DL (ref 8–23)
BUN/CREAT SERPL: 46.3 (ref 7–25)
CALCIUM SPEC-SCNC: 9.2 MG/DL (ref 8.6–10.5)
CHLORIDE SERPL-SCNC: 127 MMOL/L (ref 98–107)
CO2 SERPL-SCNC: 23 MMOL/L (ref 22–29)
CREAT SERPL-MCNC: 1.64 MG/DL (ref 0.76–1.27)
DEPRECATED RDW RBC AUTO: 56.4 FL (ref 37–54)
EGFRCR SERPLBLD CKD-EPI 2021: 40 ML/MIN/1.73
EOSINOPHIL # BLD AUTO: 0.1 10*3/MM3 (ref 0–0.4)
EOSINOPHIL NFR BLD AUTO: 0.8 % (ref 0.3–6.2)
ERYTHROCYTE [DISTWIDTH] IN BLOOD BY AUTOMATED COUNT: 18.2 % (ref 12.3–15.4)
GLUCOSE BLDC GLUCOMTR-MCNC: 100 MG/DL (ref 70–105)
GLUCOSE BLDC GLUCOMTR-MCNC: 100 MG/DL (ref 70–105)
GLUCOSE BLDC GLUCOMTR-MCNC: 115 MG/DL (ref 70–105)
GLUCOSE BLDC GLUCOMTR-MCNC: 99 MG/DL (ref 70–105)
GLUCOSE SERPL-MCNC: 107 MG/DL (ref 65–99)
HCT VFR BLD AUTO: 33.2 % (ref 37.5–51)
HGB BLD-MCNC: 10.3 G/DL (ref 13–17.7)
LYMPHOCYTES # BLD AUTO: 0.8 10*3/MM3 (ref 0.7–3.1)
LYMPHOCYTES NFR BLD AUTO: 6.7 % (ref 19.6–45.3)
MAGNESIUM SERPL-MCNC: 2.3 MG/DL (ref 1.6–2.4)
MCH RBC QN AUTO: 27.5 PG (ref 26.6–33)
MCHC RBC AUTO-ENTMCNC: 31 G/DL (ref 31.5–35.7)
MCV RBC AUTO: 88.7 FL (ref 79–97)
MONOCYTES # BLD AUTO: 0.7 10*3/MM3 (ref 0.1–0.9)
MONOCYTES NFR BLD AUTO: 5.7 % (ref 5–12)
NEUTROPHILS NFR BLD AUTO: 10.3 10*3/MM3 (ref 1.7–7)
NEUTROPHILS NFR BLD AUTO: 86.5 % (ref 42.7–76)
NRBC BLD AUTO-RTO: 0 /100 WBC (ref 0–0.2)
PLATELET # BLD AUTO: 289 10*3/MM3 (ref 140–450)
PMV BLD AUTO: 8.7 FL (ref 6–12)
POTASSIUM SERPL-SCNC: 4 MMOL/L (ref 3.5–5.2)
QT INTERVAL: 352 MS
QTC INTERVAL: 459 MS
RBC # BLD AUTO: 3.74 10*6/MM3 (ref 4.14–5.8)
SODIUM SERPL-SCNC: 159 MMOL/L (ref 136–145)
WBC NRBC COR # BLD AUTO: 12 10*3/MM3 (ref 3.4–10.8)

## 2024-01-23 PROCEDURE — 25010000002 HEPARIN (PORCINE) PER 1000 UNITS

## 2024-01-23 PROCEDURE — 80048 BASIC METABOLIC PNL TOTAL CA: CPT | Performed by: NURSE PRACTITIONER

## 2024-01-23 PROCEDURE — 99231 SBSQ HOSP IP/OBS SF/LOW 25: CPT | Performed by: NURSE PRACTITIONER

## 2024-01-23 PROCEDURE — 82948 REAGENT STRIP/BLOOD GLUCOSE: CPT | Performed by: HOSPITALIST

## 2024-01-23 PROCEDURE — 36415 COLL VENOUS BLD VENIPUNCTURE: CPT | Performed by: NURSE PRACTITIONER

## 2024-01-23 PROCEDURE — 99497 ADVNCD CARE PLAN 30 MIN: CPT | Performed by: NURSE PRACTITIONER

## 2024-01-23 PROCEDURE — 85025 COMPLETE CBC W/AUTO DIFF WBC: CPT | Performed by: NURSE PRACTITIONER

## 2024-01-23 PROCEDURE — 25010000002 CEFTRIAXONE PER 250 MG: Performed by: INTERNAL MEDICINE

## 2024-01-23 PROCEDURE — 83735 ASSAY OF MAGNESIUM: CPT | Performed by: NURSE PRACTITIONER

## 2024-01-23 PROCEDURE — 82948 REAGENT STRIP/BLOOD GLUCOSE: CPT

## 2024-01-23 RX ORDER — ACETAMINOPHEN 650 MG/1
650 SUPPOSITORY RECTAL EVERY 4 HOURS PRN
Status: DISCONTINUED | OUTPATIENT
Start: 2024-01-23 | End: 2024-01-26 | Stop reason: HOSPADM

## 2024-01-23 RX ADMIN — PANTOPRAZOLE SODIUM 40 MG: 40 INJECTION, POWDER, FOR SOLUTION INTRAVENOUS at 05:40

## 2024-01-23 RX ADMIN — ACETAMINOPHEN 650 MG: 650 SUPPOSITORY RECTAL at 18:18

## 2024-01-23 RX ADMIN — HEPARIN SODIUM 5000 UNITS: 5000 INJECTION, SOLUTION INTRAVENOUS; SUBCUTANEOUS at 14:31

## 2024-01-23 RX ADMIN — CEFTRIAXONE 1000 MG: 1 INJECTION, POWDER, FOR SOLUTION INTRAMUSCULAR; INTRAVENOUS at 08:54

## 2024-01-23 RX ADMIN — Medication 10 ML: at 21:07

## 2024-01-23 RX ADMIN — Medication 10 ML: at 08:54

## 2024-01-23 RX ADMIN — DEXTROSE AND SODIUM CHLORIDE 125 ML/HR: 5; 450 INJECTION, SOLUTION INTRAVENOUS at 02:17

## 2024-01-23 RX ADMIN — DEXTROSE AND SODIUM CHLORIDE 125 ML/HR: 5; 450 INJECTION, SOLUTION INTRAVENOUS at 13:48

## 2024-01-23 RX ADMIN — HEPARIN SODIUM 5000 UNITS: 5000 INJECTION, SOLUTION INTRAVENOUS; SUBCUTANEOUS at 21:06

## 2024-01-23 RX ADMIN — HEPARIN SODIUM 5000 UNITS: 5000 INJECTION, SOLUTION INTRAVENOUS; SUBCUTANEOUS at 05:40

## 2024-01-23 NOTE — PLAN OF CARE
Goal Outcome Evaluation:  Plan of Care Reviewed With: patient           Outcome Evaluation: no changes throughout shift. family to speak with Paliative tomorrow at 1630 in room.

## 2024-01-23 NOTE — CASE MANAGEMENT/SOCIAL WORK
Continued Stay Note  [facility]     Patient Name: Cookie Larsen  MRN: [id number]  Today's Date: 1/23/2024    Admit Date: 1/21/2024    Plan: Plan to return to [facility].   Discharge Plan       Row Name 01/23/24 1004       Plan    Plan Plan to return to [facility].    Plan Comments DC Barriers: IV Abxs, cultures pending, abnormal labs, FC, D51/2NS, 2L O2 NC, cardiac monitoring, Palliative consulted.                 Expected Discharge Date and Time       Expected Discharge Date Expected Discharge Time    Jan 25, 2024               JACKIE Mckoy RN  SIPS/ICU   O: [phone]  C: [phone]  [email or web address]     yes

## 2024-01-23 NOTE — NURSING NOTE
No significant events throughout the shift. Pt has been more alert but not verbalizing needs or following commands. 450 UOP and 2 bowel movements. VSS at this time, continuing to monitor.

## 2024-01-23 NOTE — PROGRESS NOTES
"Palliative Care Daily Progress Note     C/C: Altered mental status     S: Cookie Larsen is a 88 y.o. male who presented to [facility] ED on 1/21 from [facility] with reports of altered mental status. Patient has baseline dementia, minimally able to participate in conversation. Per staff, patient has not been eating or drinking. Patient also weak and sleeping more.      In ED: Glucose 128, Creatinine 4.21, Albumin 3.0, Troponin 347, Creatinine kinase 812, WBC 13, Hgb 12, Platelets 374, Lactate 3.9     UA with leuks, protein, ketones     CT Head Without Contrast   Result Date: 1/21/2024  Impression: 1.Global atrophy. 2.White matter changes which could reflect more chronic small vessel ischemic change 3.Evidence for previous mastoid surgery 4.Suggested cochlear implant.      Cultures obtained. Started on IV antibiotics. Admitted to ICU.     1/22 Palliative consult for goals of care discussion in an elderly dementia patient.     1/23         O: Code Status:   Code Status and Medical Interventions:   Ordered at: 01/21/24 2133     Medical Intervention Limits:    NO intubation (DNI)     Code Status (Patient has no pulse and is not breathing):    No CPR (Do Not Attempt to Resuscitate)     Medical Interventions (Patient has pulse or is breathing):    Limited Support     Comments:    Discussed with patient, wife, daughter, and son      Advanced Directives: Advance Directive Status: Patient has advance directive, copy requested   Goals of Care: Ongoing.   Palliative Performance Scale Score:       /49   Pulse 105   Temp 99.5 °F (37.5 °C) (Axillary)   Resp 20   Ht 185.4 cm (73\")   Wt 70.5 kg (155 lb 6.8 oz)   SpO2 97%   BMI 20.51 kg/m²     Intake/Output Summary (Last 24 hours) at 1/23/2024 1101  Last data filed at 1/23/2024 0537  Gross per 24 hour   Intake 2520 ml   Output 1550 ml   Net 970 ml         Review of Systems   Unable to perform ROS: Mental status change   All other systems reviewed and are negative.        Physical " Exam  Vitals and nursing note reviewed.   Constitutional:       Appearance: He is ill-appearing.      Comments: frail   HENT:      Head: Normocephalic and atraumatic.      Nose: Nose normal.      Mouth/Throat:      Mouth: Mucous membranes are dry.      Pharynx: Oropharynx is clear.   Eyes:      Conjunctiva/sclera: Conjunctivae normal.   Cardiovascular:      Rate and Rhythm: Tachycardia present.      Pulses: Normal pulses.   Pulmonary:      Effort: Pulmonary effort is normal.   Abdominal:      General: Abdomen is flat.   Musculoskeletal:         General: Normal range of motion.      Cervical back: Normal range of motion.   Skin:     General: Skin is dry.   Neurological:      General: No focal deficit present.      Mental Status: He is disoriented.         Labs:   Results from last 7 days   Lab Units 01/22/24  0712   WBC 10*3/mm3 10.10   HEMOGLOBIN g/dL 8.9*   HEMATOCRIT % 27.9*   PLATELETS 10*3/mm3 253     Results from last 7 days   Lab Units 01/22/24  0712   SODIUM mmol/L 150*   POTASSIUM mmol/L 4.2   CHLORIDE mmol/L 118*   CO2 mmol/L 19.0*   BUN mg/dL 113*   CREATININE mg/dL 2.72*   GLUCOSE mg/dL 325*   CALCIUM mg/dL 8.3*     Results from last 7 days   Lab Units 01/22/24  0712 01/21/24  1859 01/21/24  1627   SODIUM mmol/L 150*   < > 161*   POTASSIUM mmol/L 4.2   < > 6.1*   CHLORIDE mmol/L 118*   < > 124*   CO2 mmol/L 19.0*   < > 23.0   BUN mg/dL 113*   < > 150*   CREATININE mg/dL 2.72*   < > 4.21*   CALCIUM mg/dL 8.3*   < > 9.0   BILIRUBIN mg/dL  --   --  0.4   ALK PHOS U/L  --   --  76   ALT (SGPT) U/L  --   --  28   AST (SGOT) U/L  --   --  33   GLUCOSE mg/dL 325*   < > 128*    < > = values in this interval not displayed.         Diagnostics and medications: Reviewed      A: [unfilled]    ADVANCED CARE PLANNING    1/22 Met with patient at bedside. He is not alert and oriented, unable to participate in conversation at time of round. Family has elected to make patient a DNR/DNI. I attempted to reach wife this  afternoon via phone but was unsuccessful. I will follow up.    1/23  No family present at time of rounding. I called and spoke with spouse, son, and two daughters this evening. Extensive conversation regarding goals of care. We discussed continuation of aggressive treatment with NG and possible PEG placement vs hospice comfort services back at [facility]. We discussed his progressing dementia, and overall functional decline over the previous months. Wife appropriately tearful stating that she is overwhelmed and believes she just wants the patient to be comfortable. She tells me that another doctor expressed concern about his overall recovery if he were to need a PEG. Wife agreed. We discussed hospice in detail, how we would not proceed with a feeding tube, how he would eat for comfort, but that he could ultimately pass sooner. Family requests to meet with hospice services tomorrow afternoon. [facility] hospice referral made. Meeting with family tomorrow at 430pm.     Assessment:    Sepsis: secondary to UTI. Cultures obtained. Started on IV antibiotics.      Metabolic encephalopathy: CT head with no acute findings but global atrophy.      Hypernatremia: decrease oral intake. On D5W. BMP per primary.      JACQUI: likely secondary to dehydration. On IV fluids.      CHF/HTN/Depression/Dementia: chronic conditions per primary.     These  illnesses and their management contribute to the need for a palliative consult and advanced care planning.      P:   Palliative Care Team will continue to follow patient.         Priscila Sandoval, APRN  1/23/2024    I spent 29 minutes reviewing providers documentation, medication records, assessing and examining patient, discussing with family, answering questions, providing guidance about a plan of care, and coordinating care with other healthcare members. More than 50% of time spent face to face discussing disease education, current clinical status, and medication managemen    I spent  an additional 28 minutes on advanced care planning, goals of care, and code status discussion. I obtained consent for ACP discussion.

## 2024-01-23 NOTE — PROGRESS NOTES
[facility] Hospitalist Progress Note     Cookie Larsen : 1935 MRN:[id number] LOS:2  Rm: 2312/1     Principal Problem: Severe sepsis     Reason for follow up: All the medical problems listed below    Summary     Cookie Larsen is a 88 y.o. male with PMH of CHF, HTN, depression, anxiety, alcohol abuse, presented to the hospital for altered mental status, and was admitted with a principal diagnosis of Severe sepsis.      Per patient's family, the patient has not been eating or drinking for about three days. Per ER documentation, nursing home reported a steady decline over the patient couple of weeks.      ED course: Patient given sepsis bolus plus an additional liter of IVF. Blood culture drawn. Patient was given a dose of cefepime. UA positive for leukocytes, negative for nitrates. , creatine 4.21, sodium elevated at 161, potassium elevated at 6.1. He was admitted to the ICU with continued IVF resuscitation overnight and improvement in lab values.    Significant Events     24 : Did OK overnight, somewhat more alert but still not communicating or following commands.  Good urine output and bowel movements.  Palliative care is following.  Family has not yet decided to move forward with comfort care.  He is from a long-term care facility and they are hoping he will improve enough to return there.  Blood pressures are looking good.  He remains afebrile.  Renal function continues to improve, but we do not have any labs yet this morning.  He remains on Rocephin for UTI treatment.  Urine culture has not reported any growth yet.  Remaining infectious workup has been unremarkable.  He remains n.p.o. on IV fluid support.  Echo was fairly unremarkable.  Extensive chart review for service turnover today. Discussed the case with the bedside nursing staff. The patient's case was also discussed with the pharmacy and case management providers at multidisciplinary rounds. No other acute concerns.    Assessment /  Plan     Severe Sepsis: ( WBC>12 or <4 or >10% bands, Temp > 100.4 or < 96.8, HR>90, Lactic acid>2, and Creatinine >2 )  Likely due to urosepsis.   Follow blood culture / urine cultures, NGTD  Received cefepime, de-escalated to ceftriaxone monotherapy. Received vancomycin x 1 dose. MRSA negative.    In ER patient given total of 3,274 ml per sepsis + 1L   Per chart review, last echo on 2/27/19 EF 47% with diastolic dysfunction, follow up echo this admission shows normal EF.   Initial lactic 3.9 --> 5.5, Procal 0.35     Acute encephalopathy / h/o dementia--likely exacerbated by sepsis  CT head -- global atrophy, negative for any acute findings, minimal improvement thus far     Hypernatremia   Likely from insensible losses + decreased oral intake  Slowly improving with fluid resuscitation.  IVF changed to D5 1/2NS     Acute kidney injury  Likely 2/2 dehydration. Slowly improving with IVF resuscitation  Baseline creatine 1.1  Monitor Is/Os  Monitor electrolytes     Hyperkalemia-resolved     Hypoglycemia-resolved  Continue IVF  Accuchecks with hypoglycemia protocol in place     Severe malnutrition  Inpatient nutrition consult  Family declined feeding tube  NPO pending SLP evaluation if his mentation improves enough to engage     Chronic:  Diastolic heart failure -- Currently compensated. Echo 1/22/23 EF 60%   Essential hypertension -- Hold home losartan for now due to NPO and normotensive  Depression/anxiety -- Continue home celexa, ativan, remeron, risperdone, when clinically appropriate--currently NPO    Disposition:  Family hopes to return to [facility] L[facility] if he improves enough to do so--consideration for comfort care/hospice if he doesn't    Code status:   Medical Intervention Limits: NO intubation (DNI)  Code Status (Patient has no pulse and is not breathing): No CPR (Do Not Attempt to Resuscitate)  Medical Interventions (Patient has pulse or is breathing): Limited Support  Comments: Discussed with patient,  wife, daughter, and son    Nutrition:   NPO Diet NPO Type: Strict NPO   Patient isn't on Tube Feeding     DVT prophylaxis:  Medical and mechanical DVT prophylaxis orders are present.     Subjective / Review of systems     Review of Systems   Pt nonverbal and noncontributory with baseline of dementia  Objective / Physical Exam     Vital signs:  Temp: 99.1 °F (37.3 °C)  BP: 120/60  Heart Rate: 104  Resp: 20  SpO2: 99 %  Weight: 70.5 kg (155 lb 6.8 oz)    Admission Weight: Weight: 98 kg (216 lb)  Current Weight: Weight: 70.5 kg (155 lb 6.8 oz)    Input/Output in last 24 hours:    Intake/Output Summary (Last 24 hours) at 1/23/2024 0815  Last data filed at 1/23/2024 0537  Gross per 24 hour   Intake 2520 ml   Output 1550 ml   Net 970 ml      Net IO Since Admission: 720 mL [01/23/24 0815]     Physical Exam  Vitals and nursing note reviewed.   Constitutional:       General: He is not in acute distress.     Appearance: He is ill-appearing. He is not diaphoretic.   HENT:      Head: Normocephalic and atraumatic.      Right Ear: External ear normal.      Left Ear: External ear normal.   Eyes:      General: No scleral icterus.     Conjunctiva/sclera: Conjunctivae normal.      Pupils: Pupils are equal, round, and reactive to light.   Cardiovascular:      Heart sounds: S1 normal and S2 normal. No murmur heard.     Comments: ST  Pulmonary:      Effort: Pulmonary effort is normal.      Breath sounds: Rhonchi (coarse, scattered) present. No wheezing.   Abdominal:      General: Bowel sounds are normal. There is no distension.      Palpations: Abdomen is soft.   Musculoskeletal:      Comments: Hands and feet with edema   Skin:     General: Skin is warm and dry.   Neurological:      Comments: Slight w/d from noxious stimuli          Radiology and Labs     Results from last 7 days   Lab Units 01/22/24  0712 01/21/24  1533   WBC 10*3/mm3 10.10 13.00*   HEMOGLOBIN g/dL 8.9* 12.0*   HEMATOCRIT % 27.9* 38.6   PLATELETS 10*3/mm3 253 374            Results from last 7 days   Lab Units 01/22/24  0712 01/21/24  2314 01/21/24  1859 01/21/24  1627   SODIUM mmol/L 150* 160*  --  161*   POTASSIUM mmol/L 4.2 5.5* 5.4* 6.1*   CHLORIDE mmol/L 118* 128*  --  124*   CO2 mmol/L 19.0* 20.0*  --  23.0   BUN mg/dL 113* 138*  --  150*   CREATININE mg/dL 2.72* 3.64*  --  4.21*   GLUCOSE mg/dL 325* 127*  --  128*   MAGNESIUM mg/dL 2.4  --   --  2.9*   PHOSPHORUS mg/dL  --   --   --  5.3*      Results from last 7 days   Lab Units 01/21/24  1627   ALK PHOS U/L 76   AST (SGOT) U/L 33   ALT (SGPT) U/L 28           Current medications     Scheduled Meds:   cefTRIAXone, 1,000 mg, Intravenous, Q24H  heparin (porcine), 5,000 Units, Subcutaneous, Q8H  pantoprazole, 40 mg, Intravenous, Q AM  sodium chloride, 10 mL, Intravenous, Q12H      Continuous Infusions:   dextrose 5 % and sodium chloride 0.45 %, 125 mL/hr, Last Rate: 125 mL/hr (01/23/24 0217)      Giancarlo Garcia MD   Hospitalist  01/23/24   08:15 EST

## 2024-01-24 LAB
ANION GAP SERPL CALCULATED.3IONS-SCNC: 6 MMOL/L (ref 5–15)
BASOPHILS # BLD AUTO: 0 10*3/MM3 (ref 0–0.2)
BASOPHILS NFR BLD AUTO: 0.1 % (ref 0–1.5)
BUN SERPL-MCNC: 57 MG/DL (ref 8–23)
BUN/CREAT SERPL: 43.2 (ref 7–25)
CALCIUM SPEC-SCNC: 9 MG/DL (ref 8.6–10.5)
CHLORIDE SERPL-SCNC: 129 MMOL/L (ref 98–107)
CO2 SERPL-SCNC: 23 MMOL/L (ref 22–29)
CREAT SERPL-MCNC: 1.32 MG/DL (ref 0.76–1.27)
DEPRECATED RDW RBC AUTO: 57.8 FL (ref 37–54)
EGFRCR SERPLBLD CKD-EPI 2021: 51.9 ML/MIN/1.73
EOSINOPHIL # BLD AUTO: 0.2 10*3/MM3 (ref 0–0.4)
EOSINOPHIL NFR BLD AUTO: 1.5 % (ref 0.3–6.2)
ERYTHROCYTE [DISTWIDTH] IN BLOOD BY AUTOMATED COUNT: 18.6 % (ref 12.3–15.4)
GLUCOSE BLDC GLUCOMTR-MCNC: 103 MG/DL (ref 70–105)
GLUCOSE BLDC GLUCOMTR-MCNC: 76 MG/DL (ref 70–105)
GLUCOSE BLDC GLUCOMTR-MCNC: 90 MG/DL (ref 70–105)
GLUCOSE BLDC GLUCOMTR-MCNC: 91 MG/DL (ref 70–105)
GLUCOSE SERPL-MCNC: 87 MG/DL (ref 65–99)
HCT VFR BLD AUTO: 31.4 % (ref 37.5–51)
HGB BLD-MCNC: 9.8 G/DL (ref 13–17.7)
LYMPHOCYTES # BLD AUTO: 0.8 10*3/MM3 (ref 0.7–3.1)
LYMPHOCYTES NFR BLD AUTO: 6.7 % (ref 19.6–45.3)
MAGNESIUM SERPL-MCNC: 2.2 MG/DL (ref 1.6–2.4)
MCH RBC QN AUTO: 27.8 PG (ref 26.6–33)
MCHC RBC AUTO-ENTMCNC: 31.2 G/DL (ref 31.5–35.7)
MCV RBC AUTO: 89 FL (ref 79–97)
MONOCYTES # BLD AUTO: 0.7 10*3/MM3 (ref 0.1–0.9)
MONOCYTES NFR BLD AUTO: 5.9 % (ref 5–12)
NEUTROPHILS NFR BLD AUTO: 85.8 % (ref 42.7–76)
NEUTROPHILS NFR BLD AUTO: 9.9 10*3/MM3 (ref 1.7–7)
NRBC BLD AUTO-RTO: 0.2 /100 WBC (ref 0–0.2)
PLATELET # BLD AUTO: 262 10*3/MM3 (ref 140–450)
PMV BLD AUTO: 9.5 FL (ref 6–12)
POTASSIUM SERPL-SCNC: 4.1 MMOL/L (ref 3.5–5.2)
RBC # BLD AUTO: 3.53 10*6/MM3 (ref 4.14–5.8)
SODIUM SERPL-SCNC: 158 MMOL/L (ref 136–145)
WBC NRBC COR # BLD AUTO: 11.6 10*3/MM3 (ref 3.4–10.8)

## 2024-01-24 PROCEDURE — 83735 ASSAY OF MAGNESIUM: CPT | Performed by: NURSE PRACTITIONER

## 2024-01-24 PROCEDURE — 82948 REAGENT STRIP/BLOOD GLUCOSE: CPT

## 2024-01-24 PROCEDURE — 82948 REAGENT STRIP/BLOOD GLUCOSE: CPT | Performed by: STUDENT IN AN ORGANIZED HEALTH CARE EDUCATION/TRAINING PROGRAM

## 2024-01-24 PROCEDURE — 25010000002 HEPARIN (PORCINE) PER 1000 UNITS

## 2024-01-24 PROCEDURE — 82948 REAGENT STRIP/BLOOD GLUCOSE: CPT | Performed by: HOSPITALIST

## 2024-01-24 PROCEDURE — 80048 BASIC METABOLIC PNL TOTAL CA: CPT | Performed by: NURSE PRACTITIONER

## 2024-01-24 PROCEDURE — 25010000002 CEFTRIAXONE PER 250 MG: Performed by: INTERNAL MEDICINE

## 2024-01-24 PROCEDURE — 85025 COMPLETE CBC W/AUTO DIFF WBC: CPT | Performed by: NURSE PRACTITIONER

## 2024-01-24 RX ADMIN — CEFTRIAXONE 1000 MG: 1 INJECTION, POWDER, FOR SOLUTION INTRAMUSCULAR; INTRAVENOUS at 08:49

## 2024-01-24 RX ADMIN — Medication 10 ML: at 08:48

## 2024-01-24 RX ADMIN — PANTOPRAZOLE SODIUM 40 MG: 40 INJECTION, POWDER, FOR SOLUTION INTRAVENOUS at 05:07

## 2024-01-24 RX ADMIN — DEXTROSE AND SODIUM CHLORIDE 125 ML/HR: 5; 450 INJECTION, SOLUTION INTRAVENOUS at 00:32

## 2024-01-24 RX ADMIN — Medication 10 ML: at 21:27

## 2024-01-24 RX ADMIN — HEPARIN SODIUM 5000 UNITS: 5000 INJECTION, SOLUTION INTRAVENOUS; SUBCUTANEOUS at 21:27

## 2024-01-24 RX ADMIN — HEPARIN SODIUM 5000 UNITS: 5000 INJECTION, SOLUTION INTRAVENOUS; SUBCUTANEOUS at 15:35

## 2024-01-24 RX ADMIN — DEXTROSE AND SODIUM CHLORIDE 125 ML/HR: 5; 450 INJECTION, SOLUTION INTRAVENOUS at 08:49

## 2024-01-24 RX ADMIN — HEPARIN SODIUM 5000 UNITS: 5000 INJECTION, SOLUTION INTRAVENOUS; SUBCUTANEOUS at 05:07

## 2024-01-24 RX ADMIN — DEXTROSE AND SODIUM CHLORIDE 125 ML/HR: 5; 450 INJECTION, SOLUTION INTRAVENOUS at 17:37

## 2024-01-24 RX ADMIN — ACETAMINOPHEN 650 MG: 650 SUPPOSITORY RECTAL at 21:47

## 2024-01-24 NOTE — PLAN OF CARE
Problem: Adult Inpatient Plan of Care  Goal: Plan of Care Review  Outcome: Ongoing, Progressing  Goal: Patient-Specific Goal (Individualized)  Outcome: Ongoing, Progressing  Goal: Absence of Hospital-Acquired Illness or Injury  Outcome: Ongoing, Progressing  Intervention: Identify and Manage Fall Risk  Recent Flowsheet Documentation  Taken 1/24/2024 0847 by Teetee Turcios LPN  Safety Promotion/Fall Prevention:   safety round/check completed   activity supervised   assistive device/personal items within reach   fall prevention program maintained   nonskid shoes/slippers when out of bed   room organization consistent  Intervention: Prevent Skin Injury  Recent Flowsheet Documentation  Taken 1/24/2024 0847 by Teetee Turcios LPN  Body Position:   turned   left   legs elevated  Skin Protection:   adhesive use limited   skin sealant/moisture barrier applied   skin-to-device areas padded   skin-to-skin areas padded  Intervention: Prevent and Manage VTE (Venous Thromboembolism) Risk  Recent Flowsheet Documentation  Taken 1/24/2024 0847 by Teetee Turcios LPN  Activity Management: bedrest  VTE Prevention/Management:   bilateral   sequential compression devices on  Range of Motion: ROM (range of motion) performed  Intervention: Prevent Infection  Recent Flowsheet Documentation  Taken 1/24/2024 0847 by Teetee Turcios LPN  Infection Prevention:   hand hygiene promoted   rest/sleep promoted   single patient room provided  Goal: Optimal Comfort and Wellbeing  Outcome: Ongoing, Progressing  Intervention: Monitor Pain and Promote Comfort  Recent Flowsheet Documentation  Taken 1/24/2024 0847 by Teetee Turcios LPN  Pain Management Interventions:   care clustered   diversional activity provided   pillow support provided   position adjusted  Intervention: Provide Person-Centered Care  Recent Flowsheet Documentation  Taken 1/24/2024 0847 by Teetee Turcios LPN  Trust Relationship/Rapport:   care explained   reassurance  provided  Goal: Readiness for Transition of Care  Outcome: Ongoing, Progressing     Problem: Fall Injury Risk  Goal: Absence of Fall and Fall-Related Injury  Outcome: Ongoing, Progressing  Intervention: Identify and Manage Contributors  Recent Flowsheet Documentation  Taken 1/24/2024 0847 by Teetee Turcios LPN  Medication Review/Management: medications reviewed  Intervention: Promote Injury-Free Environment  Recent Flowsheet Documentation  Taken 1/24/2024 0847 by Teetee Turcios LPN  Safety Promotion/Fall Prevention:   safety round/check completed   activity supervised   assistive device/personal items within reach   fall prevention program maintained   nonskid shoes/slippers when out of bed   room organization consistent  Goal: Absence of Fall and Fall-Related Injury  Outcome: Ongoing, Progressing  Intervention: Identify and Manage Contributors  Recent Flowsheet Documentation  Taken 1/24/2024 0847 by Teetee Turcios LPN  Medication Review/Management: medications reviewed  Intervention: Promote Injury-Free Environment  Recent Flowsheet Documentation  Taken 1/24/2024 0847 by Teetee Turcios LPN  Safety Promotion/Fall Prevention:   safety round/check completed   activity supervised   assistive device/personal items within reach   fall prevention program maintained   nonskid shoes/slippers when out of bed   room organization consistent     Problem: Adjustment to Illness (Sepsis/Septic Shock)  Goal: Optimal Coping  Outcome: Ongoing, Progressing  Intervention: Optimize Psychosocial Adjustment to Illness  Recent Flowsheet Documentation  Taken 1/24/2024 0847 by Teetee Turcios LPN  Family/Support System Care: support provided     Problem: Bleeding (Sepsis/Septic Shock)  Goal: Absence of Bleeding  Outcome: Ongoing, Progressing     Problem: Glycemic Control Impaired (Sepsis/Septic Shock)  Goal: Blood Glucose Level Within Desired Range  Outcome: Ongoing, Progressing     Problem: Infection Progression (Sepsis/Septic  Shock)  Goal: Absence of Infection Signs and Symptoms  Outcome: Ongoing, Progressing  Intervention: Initiate Sepsis Management  Recent Flowsheet Documentation  Taken 1/24/2024 0847 by Teetee Turcios LPN  Infection Prevention:   hand hygiene promoted   rest/sleep promoted   single patient room provided  Intervention: Promote Recovery  Recent Flowsheet Documentation  Taken 1/24/2024 0847 by Teetee Turcios LPN  Activity Management: bedrest  Sleep/Rest Enhancement: awakenings minimized     Problem: Nutrition Impaired (Sepsis/Septic Shock)  Goal: Optimal Nutrition Intake  Outcome: Ongoing, Progressing     Problem: Skin Injury Risk Increased  Goal: Skin Health and Integrity  Outcome: Ongoing, Progressing  Intervention: Optimize Skin Protection  Recent Flowsheet Documentation  Taken 1/24/2024 0847 by Teetee Turcios LPN  Pressure Reduction Techniques:   weight shift assistance provided   rest period provided between sit times  Head of Bed (HOB) Positioning: HOB at 30 degrees  Pressure Reduction Devices:   heel offloading device utilized   alternating pressure pump (ADD)  Skin Protection:   adhesive use limited   skin sealant/moisture barrier applied   skin-to-device areas padded   skin-to-skin areas padded     Problem: Behavioral Health Comorbidity  Goal: Maintenance of Behavioral Health Symptom Control  Outcome: Ongoing, Progressing  Intervention: Maintain Behavioral Health Symptom Control  Recent Flowsheet Documentation  Taken 1/24/2024 0847 by Teetee Turcios LPN  Medication Review/Management: medications reviewed     Problem: Malnutrition  Goal: Improved Nutritional Intake  Outcome: Ongoing, Progressing   Goal Outcome Evaluation:        Patient resting quietly in bed at this time. Patient unable to hear staff due to Cochlear implant not here at the hospital. Patient doesn't appear to have any pain at this itme. Patient on 2 LPM via nasal cannula with no SOA noted. IV fluids continue per order with IV antibiotics  per order. Call light within reach.

## 2024-01-24 NOTE — CASE MANAGEMENT/SOCIAL WORK
Continued Stay Note   [facility]     Patient Name: Cookie Larsen  MRN: [id number]  Today's Date: 1/24/2024    Admit Date: 1/21/2024    Plan: Plan to return to [facility], [facility].  [facility] following.   Discharge Plan       Row Name 01/24/24 1729       Plan    Plan Plan to return to [facility], [facility].  [facility] following.    Plan Comments [facility] following. Family meeting 1/24.  BArriers to discharge: Speech eval. NPO.  IV abx.  Watching labs.  IVF. O22L.             Expected Discharge Date and Time       Expected Discharge Date Expected Discharge Time    Jan 26, 2024           Keyla Pierre RN     Office Phone [phone]  Office Cell [phone]

## 2024-01-24 NOTE — NURSING NOTE
88-year-old male who presents from his ECF with altered mental status changes.  Patient has a history of dementia.  He opens eyes but he does not respond to questions.  Patient is seen today for new heel pressure injury.    The left heel has pigmentation darker than his normal skin tone but it is blanching.  However the right lateral heel also has pigmentation darker than his normal skin tone.  It does not rafita.  It is a stage I pressure injury.  I did place the patient in midline soft offloading boots.  Recommend to continue to monitor the area and to aggressively keep his heels offloaded.  Will follow as needed

## 2024-01-24 NOTE — PROGRESS NOTES
[facility] MEDICINE SERVICE  DAILY PROGRESS NOTE    NAME: Cookie Larsen  : 1935  MRN: [id number]      LOS: 3 days     PROVIDER OF SERVICE: Yasir Allen MD    Chief Complaint: Severe sepsis  Cookie Larsen is a 88 y.o. male with PMH of CHF, HTN, depression, anxiety, alcohol abuse, presented to the hospital for altered mental status, and was admitted with a principal diagnosis of Severe sepsis.   Subjective:     Interval History:  History taken from: patient RN  Patient Complaints: Poorly communicating poorly verbal.  Overnight events noted palliative care team discussed with family awaiting decision on hospice  Patient Denies: unable to obtain patient nonverbal    Review of Systems:   Review of Systems  Unable to obtain patient nonverbal  Objective:     Vital Signs  Temp:  [97.2 °F (36.2 °C)-101.2 °F (38.4 °C)] 97.2 °F (36.2 °C)  Heart Rate:  [] 108  Resp:  [13-24] 13  BP: (100-143)/(49-97) 132/55  Flow (L/min):  [2] 2   Body mass index is 20.51 kg/m².    Physical Exam  Physical Exam  Constitutional:       Comments: Lethargic   HENT:      Head: Atraumatic.      Mouth/Throat:      Mouth: Mucous membranes are dry.   Eyes:      Pupils: Pupils are equal, round, and reactive to light.   Cardiovascular:      Rate and Rhythm: Normal rate and regular rhythm.      Pulses: Normal pulses.      Heart sounds: Normal heart sounds.   Pulmonary:      Effort: Pulmonary effort is normal.      Breath sounds: Normal breath sounds.      Comments: On oxygen supplementation 2 LPM  Abdominal:      General: Bowel sounds are normal.      Palpations: Abdomen is soft.   Musculoskeletal:         General: Normal range of motion.      Cervical back: Neck supple.   Skin:     General: Skin is warm.   Neurological:      Comments: Lethargic, able to move extremities response to noxious stimuli         Scheduled Meds   cefTRIAXone, 1,000 mg, Intravenous, Q24H  heparin (porcine), 5,000 Units, Subcutaneous, Q8H  pantoprazole,  40 mg, Intravenous, Q AM  sodium chloride, 10 mL, Intravenous, Q12H       PRN Meds     acetaminophen    dextrose    dextrose    glucagon (human recombinant)    nitroglycerin    ondansetron ODT **OR** ondansetron    [COMPLETED] Insert Peripheral IV **AND** sodium chloride    sodium chloride    sodium chloride   Infusions  dextrose 5 % and sodium chloride 0.45 %, 125 mL/hr, Last Rate: 125 mL/hr (01/24/24 0849)          Diagnostic Data    Results from last 7 days   Lab Units 01/24/24  0913 01/21/24  1859 01/21/24  1627   WBC 10*3/mm3 11.60*   < >  --    HEMOGLOBIN g/dL 9.8*   < >  --    HEMATOCRIT % 31.4*   < >  --    PLATELETS 10*3/mm3 262   < >  --    GLUCOSE mg/dL 87   < > 128*   CREATININE mg/dL 1.32*   < > 4.21*   BUN mg/dL 57*   < > 150*   SODIUM mmol/L 158*   < > 161*   POTASSIUM mmol/L 4.1   < > 6.1*   AST (SGOT) U/L  --   --  33   ALT (SGPT) U/L  --   --  28   ALK PHOS U/L  --   --  76   BILIRUBIN mg/dL  --   --  0.4   ANION GAP mmol/L 6.0   < > 14.0    < > = values in this interval not displayed.       No radiology results for the last day      I reviewed the patient's new clinical results.    Assessment/Plan:     Active and Resolved Problems  Acute metabolic encephalopathy  History of dementia  Severe sepsis sepsis present on admission  ( WBC>12 or <4 or >10% bands, Temp > 100.4 or < 96.8, HR>90, Lactic acid>2, and Creatinine >2 )  Likely due to urosepsis.   Follow blood culture / urine cultures, NGTD  Received cefepime, de-escalated to ceftriaxone monotherapy. Received vancomycin x 1 dose. MRSA negative.    In ER patient given total of 3,274 ml per sepsis + 1L   Per chart review, last echo on 2/27/19 EF 47% with diastolic dysfunction, follow up echo this admission shows normal EF.   Initial lactic 3.9 --> 5.5, Procal 0.35  CT head -- global atrophy, negative for any acute findings, minimal improvement thus far    Hypernatremia   Likely from insensible losses + decreased oral intake  Slowly improving with  fluid resuscitation.  IVF changed to D5 1/2NS     Acute kidney injury  Likely 2/2 dehydration. Slowly improving with IVF resuscitation  Baseline creatine 1.1  Monitor Is/Os  Monitor electrolytes     Hyperkalemia-resolved     Hypoglycemia-resolved  Continue IVF  Accuchecks with hypoglycemia protocol in place     Severe malnutrition  Inpatient nutrition consult  Family declined feeding tube  NPO pending SLP evaluation if his mentation improves enough to engage     Chronic:  Diastolic heart failure -- Currently compensated. Echo 1/22/23 EF 60%   Essential hypertension -- Hold home losartan for now due to NPO and normotensive  Depression/anxiety -- Continue home celexa, ativan, remeron, risperdone, when clinically appropriate--currently NPO     Disposition:  Family hopes to return to [facility] if he improves enough to do so--consideration for comfort care/hospice if he doesn    DVT prophylaxis:  Medical and mechanical DVT prophylaxis orders are present.         Code status is   Code Status and Medical Interventions:   Ordered at: 01/21/24 2133     Medical Intervention Limits:    NO intubation (DNI)     Code Status (Patient has no pulse and is not breathing):    No CPR (Do Not Attempt to Resuscitate)     Medical Interventions (Patient has pulse or is breathing):    Limited Support     Comments:    Discussed with patient, wife, daughter, and son       Plan for disposition - pending family decision for hospice     Time: 32 minutes    Signature: Electronically signed by Yasir Allen MD, 01/24/24, 15:21 EST.  [facility] Hospitalist Team

## 2024-01-24 NOTE — PLAN OF CARE
Goal Outcome Evaluation:  Plan of Care Reviewed With: patient     Problem: Adult Inpatient Plan of Care  Goal: Plan of Care Review  Outcome: Ongoing, Progressing  Flowsheets (Taken 1/24/2024 8605)  Progress: no change  Plan of Care Reviewed With: patient        Progress: no change

## 2024-01-25 LAB
ANION GAP SERPL CALCULATED.3IONS-SCNC: 8 MMOL/L (ref 5–15)
BASOPHILS # BLD AUTO: 0 10*3/MM3 (ref 0–0.2)
BASOPHILS NFR BLD AUTO: 0.2 % (ref 0–1.5)
BUN SERPL-MCNC: 49 MG/DL (ref 8–23)
BUN/CREAT SERPL: 35.8 (ref 7–25)
CALCIUM SPEC-SCNC: 8.3 MG/DL (ref 8.6–10.5)
CHLORIDE SERPL-SCNC: 128 MMOL/L (ref 98–107)
CO2 SERPL-SCNC: 21 MMOL/L (ref 22–29)
CREAT SERPL-MCNC: 1.37 MG/DL (ref 0.76–1.27)
DEPRECATED RDW RBC AUTO: 57.3 FL (ref 37–54)
EGFRCR SERPLBLD CKD-EPI 2021: 49.6 ML/MIN/1.73
EOSINOPHIL # BLD AUTO: 0.4 10*3/MM3 (ref 0–0.4)
EOSINOPHIL NFR BLD AUTO: 3.5 % (ref 0.3–6.2)
ERYTHROCYTE [DISTWIDTH] IN BLOOD BY AUTOMATED COUNT: 18.1 % (ref 12.3–15.4)
GLUCOSE BLDC GLUCOMTR-MCNC: 101 MG/DL (ref 70–105)
GLUCOSE BLDC GLUCOMTR-MCNC: 106 MG/DL (ref 70–105)
GLUCOSE BLDC GLUCOMTR-MCNC: 112 MG/DL (ref 70–105)
GLUCOSE BLDC GLUCOMTR-MCNC: 112 MG/DL (ref 70–105)
GLUCOSE BLDC GLUCOMTR-MCNC: 126 MG/DL (ref 70–105)
GLUCOSE SERPL-MCNC: 106 MG/DL (ref 65–99)
HCT VFR BLD AUTO: 30.6 % (ref 37.5–51)
HGB BLD-MCNC: 9.5 G/DL (ref 13–17.7)
LYMPHOCYTES # BLD AUTO: 0.9 10*3/MM3 (ref 0.7–3.1)
LYMPHOCYTES NFR BLD AUTO: 8.8 % (ref 19.6–45.3)
MAGNESIUM SERPL-MCNC: 2.1 MG/DL (ref 1.6–2.4)
MCH RBC QN AUTO: 27.8 PG (ref 26.6–33)
MCHC RBC AUTO-ENTMCNC: 31.1 G/DL (ref 31.5–35.7)
MCV RBC AUTO: 89.3 FL (ref 79–97)
MONOCYTES # BLD AUTO: 0.8 10*3/MM3 (ref 0.1–0.9)
MONOCYTES NFR BLD AUTO: 7.5 % (ref 5–12)
NEUTROPHILS NFR BLD AUTO: 8.6 10*3/MM3 (ref 1.7–7)
NEUTROPHILS NFR BLD AUTO: 80 % (ref 42.7–76)
NRBC BLD AUTO-RTO: 0.1 /100 WBC (ref 0–0.2)
PLATELET # BLD AUTO: 225 10*3/MM3 (ref 140–450)
PMV BLD AUTO: 9.7 FL (ref 6–12)
POTASSIUM SERPL-SCNC: 3.5 MMOL/L (ref 3.5–5.2)
RBC # BLD AUTO: 3.42 10*6/MM3 (ref 4.14–5.8)
SODIUM SERPL-SCNC: 157 MMOL/L (ref 136–145)
WBC NRBC COR # BLD AUTO: 10.7 10*3/MM3 (ref 3.4–10.8)

## 2024-01-25 PROCEDURE — 82948 REAGENT STRIP/BLOOD GLUCOSE: CPT | Performed by: STUDENT IN AN ORGANIZED HEALTH CARE EDUCATION/TRAINING PROGRAM

## 2024-01-25 PROCEDURE — 25010000002 CEFTRIAXONE PER 250 MG: Performed by: INTERNAL MEDICINE

## 2024-01-25 PROCEDURE — 80048 BASIC METABOLIC PNL TOTAL CA: CPT | Performed by: NURSE PRACTITIONER

## 2024-01-25 PROCEDURE — 85025 COMPLETE CBC W/AUTO DIFF WBC: CPT | Performed by: NURSE PRACTITIONER

## 2024-01-25 PROCEDURE — 83735 ASSAY OF MAGNESIUM: CPT | Performed by: NURSE PRACTITIONER

## 2024-01-25 PROCEDURE — 36415 COLL VENOUS BLD VENIPUNCTURE: CPT | Performed by: NURSE PRACTITIONER

## 2024-01-25 PROCEDURE — 25010000002 HEPARIN (PORCINE) PER 1000 UNITS

## 2024-01-25 PROCEDURE — 92610 EVALUATE SWALLOWING FUNCTION: CPT

## 2024-01-25 PROCEDURE — 99231 SBSQ HOSP IP/OBS SF/LOW 25: CPT | Performed by: NURSE PRACTITIONER

## 2024-01-25 PROCEDURE — 82948 REAGENT STRIP/BLOOD GLUCOSE: CPT

## 2024-01-25 RX ADMIN — Medication 10 ML: at 21:27

## 2024-01-25 RX ADMIN — DEXTROSE AND SODIUM CHLORIDE 100 ML/HR: 5; 450 INJECTION, SOLUTION INTRAVENOUS at 21:38

## 2024-01-25 RX ADMIN — PANTOPRAZOLE SODIUM 40 MG: 40 INJECTION, POWDER, FOR SOLUTION INTRAVENOUS at 06:16

## 2024-01-25 RX ADMIN — HEPARIN SODIUM 5000 UNITS: 5000 INJECTION, SOLUTION INTRAVENOUS; SUBCUTANEOUS at 21:27

## 2024-01-25 RX ADMIN — HEPARIN SODIUM 5000 UNITS: 5000 INJECTION, SOLUTION INTRAVENOUS; SUBCUTANEOUS at 14:18

## 2024-01-25 RX ADMIN — CEFTRIAXONE 1000 MG: 1 INJECTION, POWDER, FOR SOLUTION INTRAMUSCULAR; INTRAVENOUS at 08:31

## 2024-01-25 RX ADMIN — Medication 10 ML: at 08:31

## 2024-01-25 RX ADMIN — DEXTROSE AND SODIUM CHLORIDE 125 ML/HR: 5; 450 INJECTION, SOLUTION INTRAVENOUS at 03:27

## 2024-01-25 RX ADMIN — DEXTROSE AND SODIUM CHLORIDE 100 ML/HR: 5; 450 INJECTION, SOLUTION INTRAVENOUS at 12:27

## 2024-01-25 RX ADMIN — HEPARIN SODIUM 5000 UNITS: 5000 INJECTION, SOLUTION INTRAVENOUS; SUBCUTANEOUS at 06:16

## 2024-01-25 NOTE — PROGRESS NOTES
Nutrition Services    Patient Name: Cookie Larsen  YOB: 1935  MRN: [id number]  Admission date: 1/21/2024    PROGRESS NOTE      Encounter Information: Progress note to check on plan of care. Palliative care has met with family and overall goals of care are palliative/hospice-oriented. For this reason, pt not expected to meet full nutritional needs -- continued oral diet remains the most appropriate intervention, given goals of care. RD will sign off but remains available by consult, if needed.       PO Diet: Diet: Regular/House Diet; Feeding Assistance - Nursing; Texture: Pureed (NDD 1); Fluid Consistency: Thin (IDDSI 0)   PO Supplements: None ordered   PO Intake:  Not accepting PO       Current nutrition support: None ordered   Nutrition support review: Family declines feeding tube        Labs (reviewed below): Hypernatremia - dextrose-containing IV fluids being provided   BUN/Cr elevated - C/W clinical course; improving           GI Function:  Stool Output  Stool Unmeasured Occurrence: 1 (01/25/24 1300)  Bowel Incontinence: Yes (01/25/24 1300)  Stool Amount: moderate (01/25/24 1300)          Nutrition Intervention Updates: Continue pureed diet as tolerated. RD remains available by consult, if needed.       Results from last 7 days   Lab Units 01/25/24  0146 01/24/24  0913 01/23/24  1245 01/21/24  1859 01/21/24  1627   SODIUM mmol/L 157* 158* 159*   < > 161*   POTASSIUM mmol/L 3.5 4.1 4.0   < > 6.1*   CHLORIDE mmol/L 128* 129* 127*   < > 124*   CO2 mmol/L 21.0* 23.0 23.0   < > 23.0   BUN mg/dL 49* 57* 76*   < > 150*   CREATININE mg/dL 1.37* 1.32* 1.64*   < > 4.21*   CALCIUM mg/dL 8.3* 9.0 9.2   < > 9.0   BILIRUBIN mg/dL  --   --   --   --  0.4   ALK PHOS U/L  --   --   --   --  76   ALT (SGPT) U/L  --   --   --   --  28   AST (SGOT) U/L  --   --   --   --  33   GLUCOSE mg/dL 106* 87 107*   < > 128*    < > = values in this interval not displayed.     Results from last 7 days   Lab Units  01/25/24  0146 01/24/24  0913 01/23/24  1245 01/22/24  0712 01/21/24  1627   MAGNESIUM mg/dL 2.1 2.2 2.3   < > 2.9*   PHOSPHORUS mg/dL  --   --   --   --  5.3*   HEMOGLOBIN g/dL 9.5* 9.8* 10.3*   < >  --    HEMATOCRIT % 30.6* 31.4* 33.2*   < >  --     < > = values in this interval not displayed.     COVID19   Date Value Ref Range Status   01/21/2024 Not Detected Not Detected - Ref. Range Final     Lab Results   Component Value Date    HGBA1C 5.8 (H) 04/16/2023     RD to follow up per protocol.    Electronically signed by:  Mona Knox RD  01/25/24 18:01 EST

## 2024-01-25 NOTE — PROGRESS NOTES
"Palliative Care Daily Progress Note     C/C: Altered mental status     S: Cookie Larsen is a 88 y.o. male who presented to [facility] ED on 1/21 from [facility] with reports of altered mental status. Patient has baseline dementia, minimally able to participate in conversation. Per staff, patient has not been eating or drinking. Patient also weak and sleeping more.      In ED: Glucose 128, Creatinine 4.21, Albumin 3.0, Troponin 347, Creatinine kinase 812, WBC 13, Hgb 12, Platelets 374, Lactate 3.9     UA with leuks, protein, ketones     CT Head Without Contrast   Result Date: 1/21/2024  Impression: 1.Global atrophy. 2.White matter changes which could reflect more chronic small vessel ischemic change 3.Evidence for previous mastoid surgery 4.Suggested cochlear implant.      Cultures obtained. Started on IV antibiotics. Admitted to ICU.     1/22 Palliative consult for goals of care discussion in an elderly dementia patient.      1/23 No acute changes. [facility] meeting with patient.    1/24 No acute changes, more alert.       O: Code Status:   Code Status and Medical Interventions:   Ordered at: 01/21/24 2133     Medical Intervention Limits:    NO intubation (DNI)     Code Status (Patient has no pulse and is not breathing):    No CPR (Do Not Attempt to Resuscitate)     Medical Interventions (Patient has pulse or is breathing):    Limited Support     Comments:    Discussed with patient, wife, daughter, and son      Advanced Directives: Advance Directive Status: Patient has advance directive, copy requested   Goals of Care: Ongoing.   Palliative Performance Scale Score:       /70   Pulse 89   Temp 98.1 °F (36.7 °C) (Axillary)   Resp 19   Ht 185.4 cm (73\")   Wt 70.5 kg (155 lb 6.8 oz)   SpO2 100%   BMI 20.51 kg/m²     Intake/Output Summary (Last 24 hours) at 1/25/2024 1223  Last data filed at 1/25/2024 0340  Gross per 24 hour   Intake 1000 ml   Output 1500 ml   Net -500 ml         Review of Systems   Unable to perform " ROS: Mental status change   All other systems reviewed and are negative.        Physical Exam  Vitals and nursing note reviewed.   Constitutional:       Appearance: He is ill-appearing.   HENT:      Head: Normocephalic and atraumatic.      Nose: Nose normal.      Mouth/Throat:      Mouth: Mucous membranes are dry.      Pharynx: Oropharynx is clear.   Eyes:      Conjunctiva/sclera: Conjunctivae normal.   Cardiovascular:      Rate and Rhythm: Normal rate.      Pulses: Normal pulses.   Pulmonary:      Effort: Pulmonary effort is normal.   Abdominal:      Palpations: Abdomen is soft.   Musculoskeletal:         General: Normal range of motion.      Cervical back: Normal range of motion.   Skin:     General: Skin is dry.   Neurological:      Mental Status: He is alert. He is disoriented.         Labs:   Results from last 7 days   Lab Units 01/25/24  0146   WBC 10*3/mm3 10.70   HEMOGLOBIN g/dL 9.5*   HEMATOCRIT % 30.6*   PLATELETS 10*3/mm3 225     Results from last 7 days   Lab Units 01/25/24  0146   SODIUM mmol/L 157*   POTASSIUM mmol/L 3.5   CHLORIDE mmol/L 128*   CO2 mmol/L 21.0*   BUN mg/dL 49*   CREATININE mg/dL 1.37*   GLUCOSE mg/dL 106*   CALCIUM mg/dL 8.3*     Results from last 7 days   Lab Units 01/25/24  0146 01/21/24  1859 01/21/24  1627   SODIUM mmol/L 157*   < > 161*   POTASSIUM mmol/L 3.5   < > 6.1*   CHLORIDE mmol/L 128*   < > 124*   CO2 mmol/L 21.0*   < > 23.0   BUN mg/dL 49*   < > 150*   CREATININE mg/dL 1.37*   < > 4.21*   CALCIUM mg/dL 8.3*   < > 9.0   BILIRUBIN mg/dL  --   --  0.4   ALK PHOS U/L  --   --  76   ALT (SGPT) U/L  --   --  28   AST (SGOT) U/L  --   --  33   GLUCOSE mg/dL 106*   < > 128*    < > = values in this interval not displayed.         Diagnostics and medications: Reviewed      A: [unfilled]    ADVANCED CARE PLANNING    1/22 Met with patient at bedside. He is not alert and oriented, unable to participate in conversation at time of round. Family has elected to make patient a DNR/DNI. I  attempted to reach wife this afternoon via phone but was unsuccessful. I will follow up.    1/23  No family present at time of rounding. I called and spoke with spouse, son, and two daughters this evening. Extensive conversation regarding goals of care. We discussed continuation of aggressive treatment with NG and possible PEG placement vs hospice comfort services back at [facility]. We discussed his progressing dementia, and overall functional decline over the previous months. Wife appropriately tearful stating that she is overwhelmed and believes she just wants the patient to be comfortable. She tells me that another doctor expressed concern about his overall recovery if he were to need a PEG. Wife agreed. We discussed hospice in detail, how we would not proceed with a feeding tube, how he would eat for comfort, but that he could ultimately pass sooner. Family requests to meet with hospice services tomorrow afternoon. [facility] hospice referral made. Meeting with family tomorrow at 430pm.   1/24 Patient in bed, remains poorly responsive. More alert. I spoke with [facility] liaison this am. Plan is for patient to return to facility with their services following. No PEG placement. They are looking at possibly changing LTC facilities but are agreeable to return to [facility] if not accepted at other facilities. Remains DNR/DNI. Overall goal for comfort.       Assessment:    Sepsis: secondary to UTI. Cultures obtained. Started on IV antibiotics.      Metabolic encephalopathy: CT head with no acute findings but global atrophy.      Hypernatremia: decrease oral intake. On D5W. BMP per primary.      JACQUI: likely secondary to dehydration. On IV fluids.      CHF/HTN/Depression/Dementia: chronic conditions per primary.    These  illnesses and their management contribute to the need for a palliative consult and advanced care planning.      P:   Palliative Care Team will continue to follow patient.          Priscila Sandoval, APRN  1/25/2024    I spent 28 minutes reviewing providers documentation, medication records, assessing and examining patient, discussing with family, answering questions, providing guidance about a plan of care, and coordinating care with other healthcare members. More than 50% of time spent face to face discussing disease education, current clinical status, and medication managemen    I spent an additional 20 minutes on advanced care planning, goals of care, and code status discussion. I obtained consent for ACP discussion.

## 2024-01-25 NOTE — PLAN OF CARE
Goal Outcome Evaluation:  Plan of Care Reviewed With: patient        Progress: no change  Outcome Evaluation: pt had one episode of being restless, non verbal was trying to tell me something, shook head when asked if in pain, gave prn tylenol, pt rested well rest of the shift after that, pump on pt bed and Q2 turn for skin health, on room air, D5 1/2 NS IV running tolerating well

## 2024-01-25 NOTE — CASE MANAGEMENT/SOCIAL WORK
Continued Stay Note   [facility]     Patient Name: Cookie Larsen  MRN: [id number]  Today's Date: 1/25/2024    Admit Date: 1/21/2024    Plan: Return to [facility], [facility].  [facility] hospice to admit on arrival to facility.  [facility] to transport.   Discharge Plan       Row Name 01/25/24 1810       Plan    Plan Return to [facility], [facility].  [facility] hospice to admit on arrival to facility.  [facility] to transport.    Patient/Family in Agreement with Plan yes    Plan Comments Family spoke with [facility] during family meeting 1/24.  Family does not want to return to [facility] per Karina with [facility]. ([facility] refusal to return to  verified by RN)  Discussed other facilities- referrals sent to [facility], [place], [place].   [place] accepted, bed available tomorrow.  Discussed with MD that we will hold DC until tomorrow to accomadate this.   This CM later called wife to discuss DC plans, and discuss need for decision on [facility] Vs [place] by am.  Spoke on phone with wife, Daughter Annie ([phone]) and second daughter.   Family had a family meeting today and discussed, have chosen to return to [facility] with [facility].  Answered all questions regarding planned DC, transport, hospice process and medications with hospice.   IMM discussed, verbal understanding noted. Declined copy.   Family requested phone call in am to Annie to update on DC time so Spouse can meet at facility.   This CM updated MD and RN on discharge plan.             Expected Discharge Date and Time       Expected Discharge Date Expected Discharge Time    Jan 26, 2024         Phone communication or documentation only - no physical contact with patient or family.   Keyla Pierre RN     Office Phone [phone]  Office Cell [phone]

## 2024-01-25 NOTE — PROGRESS NOTES
[facility] MEDICINE SERVICE  DAILY PROGRESS NOTE    NAME: Cookie Larsen  : 1935  MRN: [id number]      LOS: 4 days     PROVIDER OF SERVICE: Yasir Allen MD    Chief Complaint: Severe sepsis  Cookie Larsen is a 88 y.o. male with PMH of CHF, HTN, depression, anxiety, alcohol abuse, presented to the hospital for altered mental status, and was admitted with a principal diagnosis of Severe sepsis.   Subjective:     Interval History:  History taken from: patient RN  Patient Complaints: slight improvement in mental status , C hospice   Patient Denies: unable to obtain patient nonverbal    Review of Systems:   Review of Systems  Unable to obtain patient nonverbal  Objective:     Vital Signs  Temp:  [98.1 °F (36.7 °C)-100 °F (37.8 °C)] 98.1 °F (36.7 °C)  Heart Rate:  [] 89  Resp:  [19-26] 19  BP: (124-145)/(59-86) 139/70  Flow (L/min):  [2] 2   Body mass index is 20.51 kg/m².    Physical Exam  Physical Exam  Constitutional:       Comments: Lethargic   HENT:      Head: Atraumatic.      Mouth/Throat:      Mouth: Mucous membranes are dry.   Eyes:      Pupils: Pupils are equal, round, and reactive to light.   Cardiovascular:      Rate and Rhythm: Normal rate and regular rhythm.      Pulses: Normal pulses.      Heart sounds: Normal heart sounds.   Pulmonary:      Effort: Pulmonary effort is normal.      Breath sounds: Normal breath sounds.      Comments: On oxygen supplementation 2 LPM  Abdominal:      General: Bowel sounds are normal.      Palpations: Abdomen is soft.   Musculoskeletal:         General: Normal range of motion.      Cervical back: Neck supple.   Skin:     General: Skin is warm.   Neurological:      Comments: Lethargic, able to move extremities response to noxious stimuli         Scheduled Meds   cefTRIAXone, 1,000 mg, Intravenous, Q24H  heparin (porcine), 5,000 Units, Subcutaneous, Q8H  pantoprazole, 40 mg, Intravenous, Q AM  sodium chloride, 10 mL, Intravenous, Q12H       PRN Meds      acetaminophen    dextrose    dextrose    glucagon (human recombinant)    nitroglycerin    ondansetron ODT **OR** ondansetron    [COMPLETED] Insert Peripheral IV **AND** sodium chloride    sodium chloride    sodium chloride   Infusions  dextrose 5 % and sodium chloride 0.45 %, 100 mL/hr, Last Rate: 100 mL/hr (01/25/24 1227)          Diagnostic Data    Results from last 7 days   Lab Units 01/25/24  0146 01/21/24  1859 01/21/24  1627   WBC 10*3/mm3 10.70   < >  --    HEMOGLOBIN g/dL 9.5*   < >  --    HEMATOCRIT % 30.6*   < >  --    PLATELETS 10*3/mm3 225   < >  --    GLUCOSE mg/dL 106*   < > 128*   CREATININE mg/dL 1.37*   < > 4.21*   BUN mg/dL 49*   < > 150*   SODIUM mmol/L 157*   < > 161*   POTASSIUM mmol/L 3.5   < > 6.1*   AST (SGOT) U/L  --   --  33   ALT (SGPT) U/L  --   --  28   ALK PHOS U/L  --   --  76   BILIRUBIN mg/dL  --   --  0.4   ANION GAP mmol/L 8.0   < > 14.0    < > = values in this interval not displayed.       No radiology results for the last day      I reviewed the patient's new clinical results.    Assessment/Plan:     Active and Resolved Problems  Acute metabolic encephalopathy  History of dementia  Severe sepsis sepsis present on admission  ( WBC>12 or <4 or >10% bands, Temp > 100.4 or < 96.8, HR>90, Lactic acid>2, and Creatinine >2 )  Likely due to urosepsis.   Follow blood culture / urine cultures, NGTD  Received cefepime, de-escalated to ceftriaxone monotherapy. Received vancomycin x 1 dose. MRSA negative.    In ER patient given total of 3,274 ml per sepsis + 1L   Per chart review, last echo on 2/27/19 EF 47% with diastolic dysfunction, follow up echo this admission shows normal EF.   Initial lactic 3.9 --> 5.5, Procal 0.35  CT head -- global atrophy, negative for any acute findings, minimal improvement thus far    Hypernatremia   Likely from insensible losses + decreased oral intake  Slowly improving with fluid resuscitation.  IVF changed to D5 1/2NS     Acute kidney injury  Likely 2/2  dehydration. Slowly improving with IVF resuscitation  Baseline creatine 1.1  Monitor Is/Os  Monitor electrolytes     Hyperkalemia-resolved     Hypoglycemia-resolved  Continue IVF  Accuchecks with hypoglycemia protocol in place     Severe malnutrition  Inpatient nutrition consult  Family declined feeding tube  NPO pending SLP evaluation if his mentation improves enough to engage     Chronic:  Diastolic heart failure -- Currently compensated. Echo 1/22/23 EF 60%   Essential hypertension -- Hold home losartan for now due to NPO and normotensive  Depression/anxiety -- Continue home celexa, ativan, remeron, risperdone, when clinically appropriate--currently NPO     Disposition:   comfort care/hospice once final arrangement made     DVT prophylaxis:  Medical and mechanical DVT prophylaxis orders are present.         Code status is   Code Status and Medical Interventions:   Ordered at: 01/21/24 9864     Medical Intervention Limits:    NO intubation (DNI)     Code Status (Patient has no pulse and is not breathing):    No CPR (Do Not Attempt to Resuscitate)     Medical Interventions (Patient has pulse or is breathing):    Limited Support     Comments:    Discussed with patient, wife, daughter, and son       Plan for disposition -  hospice     Time: 32 minutes    Signature: Electronically signed by Yasir Allen MD, 01/25/24, 16:42 EST.  [facility] ([facility]ist [facility]

## 2024-01-25 NOTE — PLAN OF CARE
Goal Outcome Evaluation:               Bedside swallow evaluation conducted this date. Pt asleep upon entry, awakens easily w/tactile cueing. RN reports pt w/severe hearing deficit, communication board utilized to indicate PO trials. Pt indicates agreement and takes trials of thin water via straw and puree applesauce. Pt w/bolus holding for all trials and appears to grimace/appears repulsed by taste though does eventually swallow. Asked pt if he was nauseous utilizing gestures and communication board and pt nods yes. Pt w/belching like noise following trial of water x1, no overt s/s of aspiration w/water or applesauce. Pt opens mouth minimally w/max cueing and appears to clear puree trials though does have thick secretions/mucus remaining in oral cavity.     Recommend pt initiate a puree diet w/thin liquids, full feed, at this time. Pt should be repositioned to be sitting upright to 90 hip flexion for all PO. ST to follow for diet tolerance w/further recommendations as indicated.       Anticipated Discharge Disposition (SLP): unknown          SLP Swallowing Diagnosis: oral dysphagia, R/O pharyngeal dysphagia (01/25/24 1500)

## 2024-01-25 NOTE — THERAPY EVALUATION
Acute Care - Speech Language Pathology   Swallow Initial Evaluation  [place]     Patient Name: Cookie Larsen  : 1935  MRN: [id number]  Today's Date: 2024               Admit Date: 2024    Visit Dx:     ICD-10-CM ICD-9-CM   1. Septic shock  A41.9 038.9    R65.21 785.52     995.92   2. Urinary tract infection without hematuria, site unspecified  N39.0 599.0   3. Acute renal failure, unspecified acute renal failure type  N17.9 584.9   4. Hypernatremia  E87.0 276.0   5. Hyperkalemia  E87.5 276.7     Patient Active Problem List   Diagnosis    Ischemic cardiomyopathy    Depressive disorder    Dyslipidemia    Essential (primary) hypertension    Unspecified glaucoma    Gout    Impaired fasting glucose    Osteoarthritis of both knees    Vitamin D deficiency, unspecified    Dementia    Coronary atherosclerosis due to lipid rich plaque    Hypertensive heart disease with heart failure    Unspecified asthma, uncomplicated    Pre-operative cardiovascular examination    Cardiomyopathy, unspecified    Tachycardia    Pulmonary hypertension    Other specified arthritis, unspecified site    Cardiomegaly    Auditory hallucination    Altered mental status    Alcohol abuse    Acute urinary tract infection    Acute deep vein thrombosis (DVT) of left lower extremity    Chronic obstructive pulmonary disease, unspecified    Cochlear implant status    Problematic consumption of alcohol    Disorder of prostate, unspecified    Dry eye syndrome of bilateral lacrimal glands    Dysphagia, unspecified    Fall    History of falling    Hyperlipidemia, unspecified    Major depressive disorder, single episode, unspecified    Other retention of urine    Other symbolic dysfunctions    Personal history of other venous thrombosis and embolism    Primary open-angle glaucoma, bilateral, indeterminate stage    Primary osteoarthritis, other specified site    Unspecified convulsions    Severe alcohol dependence    Unspecified hearing loss,  unspecified ear    Unspecified protein-calorie malnutrition    Unspecified dementia, unspecified severity, with other behavioral disturbance    Unspecified dementia, unspecified severity, without behavioral disturbance, psychotic disturbance, mood disturbance, and anxiety    Severe sepsis    Severe malnutrition     Past Medical History:   Diagnosis Date    Depression     GONZALES (dyspnea on exertion)     Glaucoma     Gout     Hypertension     IFG (impaired fasting glucose)     NICM (nonischemic cardiomyopathy)     Vitamin D deficiency      Past Surgical History:   Procedure Laterality Date    CARDIAC CATHETERIZATION N/A 3/13/2019    Procedure: Coronary angiography;  Surgeon: Jovita Crews MD;  Location:  [facility];  Service: Cardiology    CARDIAC CATHETERIZATION N/A 3/13/2019    Procedure: Left Heart Cath;  Surgeon: Jovita Crews MD;  Location:  [facility];  Service: Cardiology    CARDIAC CATHETERIZATION N/A 3/13/2019    Procedure: Left ventriculography;  Surgeon: Jovita Crews MD;  Location:  [facility];  Service: Cardiology    EAR MASTOIDECTOMY W/ COCHLEAR IMPLANT W/ LANDMARK Right     HEMORRHOIDECTOMY         SLP Recommendation and Plan  SLP Swallowing Diagnosis: oral dysphagia, R/O pharyngeal dysphagia (01/25/24 1500)  SLP Diet Recommendation: puree, thin liquids (01/25/24 1500)  Recommended Precautions and Strategies: upright posture during/after eating, small bites of food and sips of liquid, assist with feeding (01/25/24 1500)  SLP Rec. for Method of Medication Administration: with puree, as tolerated (01/25/24 1500)     Monitor for Signs of Aspiration: yes, notify SLP if any concerns (01/25/24 1500)  Recommended Diagnostics: reassess via clinical swallow evaluation (01/25/24 1500)  Swallow Criteria for Skilled Therapeutic Interventions Met: demonstrates skilled criteria (01/25/24 1500)  Anticipated Discharge Disposition (SLP): unknown (01/25/24 1500)      Therapy Frequency (Swallow): PRN (01/25/24 1500)  Predicted Duration Therapy Intervention (Days): until discharge (01/25/24 1500)  Oral Care Recommendations: Oral Care before breakfast, after meals and PRN, Oral Care BID/PRN (01/25/24 1500)                                               SWALLOW EVALUATION (last 72 hours)       SLP Adult Swallow Evaluation       Row Name 01/25/24 1500       Rehab Evaluation    Document Type evaluation  -EC    Patient Observations alert;cooperative  -EC    Patient Effort fair  -EC    Symptoms Noted During/After Treatment --  belching  -EC       General Information    Patient Profile Reviewed yes  -EC    Pertinent History Of Current Problem Per ED provider notes: 88-year-old male presents from a nursing home with altered mental status.  Patient does have a history of severe dementia however nursing home reports he has been declining over the past couple weeks.  He has not been wanting to eat or drink.  He has been generally weak and sleeping more.  Family is concerned that he is overmedicated.  They apparently had a meeting with the nursing home staff a few days ago and it was agreed that they would start weaning some of his medicine to see if his mental status improved.  -EC    Current Method of Nutrition NPO  -EC    Precautions/Limitations, Vision difficult to assess  -EC    Precautions/Limitations, Hearing --  severe hearing impairment at baseline, per RN  -EC    Prior Level of Function-Communication unknown  -EC    Prior Level of Function-Swallowing puree;thin liquids  -EC    Plans/Goals Discussed with patient  -EC       Pain    Additional Documentation Pain Scale: Numbers Pre/Post-Treatment (Group)  -EC       Pain Scale: Numbers Pre/Post-Treatment    Pretreatment Pain Rating 0/10 - no pain  -EC    Posttreatment Pain Rating 0/10 - no pain  -EC       Oral Motor Structure and Function    Dentition Assessment natural, present and adequate  -EC    Mucosal Quality sticky  -EC        Oral Musculature and Cranial Nerve Assessment    Oral Motor General Assessment unable to assess  doesn't follow commands for OME  -EC       General Eating/Swallowing Observations    Respiratory Support Currently in Use nasal cannula  -EC    O2 Liters 2L  -EC    Eating/Swallowing Skills fed by SLP  -EC    Positioning During Eating upright in bed;semi-reclined  -EC    Utensils Used spoon;straw  -EC    Consistencies Trialed pureed;thin liquids  -EC       Respiratory    Respiratory Status WFL;during swallowing/eating  -EC       Clinical Swallow Eval    Clinical Swallow Evaluation Summary Bedside swallow evaluation conducted this date. Pt asleep upon entry, awakens easily w/tactile cueing. RN reports pt w/severe hearing deficit, communication board utilized to indicate PO trials. Pt indicates agreement and takes trials of thin water via straw and puree applesauce. Pt w/bolus holding for all trials and appears to grimace/appears repulsed by taste though does eventually swallow. Asked pt if he was nauseous utilizing gestures and communication board and pt nods yes. Pt w/belching like noise following trial of water x1, no overt s/s of aspiration w/water or applesauce. Pt opens mouth minimally w/max cueing and appears to clear puree trials though does have thick secretions/mucus remaining in oral cavity. Recommmend pt initiate a puree diet w/thin liquids, full feed, at this time. Pt should be repositioned to be sitting upright to 90 hip flexion for all PO. ST to follow for diet tolerance w/further recommendations as indicated.  -EC       SLP Evaluation Clinical Impression    SLP Swallowing Diagnosis oral dysphagia;R/O pharyngeal dysphagia  -EC    Functional Impact risk of aspiration/pneumonia;risk of malnutrition  -EC    Swallow Criteria for Skilled Therapeutic Interventions Met demonstrates skilled criteria  -EC       SLP Treatment Clinical Impressions    Care Plan Review evaluation/treatment results reviewed  -EC        Recommendations    Therapy Frequency (Swallow) PRN  -EC    Predicted Duration Therapy Intervention (Days) until discharge  -EC    SLP Diet Recommendation puree;thin liquids  -EC    Recommended Diagnostics reassess via clinical swallow evaluation  -EC    Recommended Precautions and Strategies upright posture during/after eating;small bites of food and sips of liquid;assist with feeding  -EC    Oral Care Recommendations Oral Care before breakfast, after meals and PRN;Oral Care BID/PRN  -EC    SLP Rec. for Method of Medication Administration with puree;as tolerated  -EC    Monitor for Signs of Aspiration yes;notify SLP if any concerns  -EC    Anticipated Discharge Disposition (SLP) unknown  -EC       Swallow Goals (SLP)    Swallow LTGs Swallow Long Term Goal (free text)  -EC    Swallow STGs diet tolerance goal selection (SLP)  -EC    Diet Tolerance Goal Selection (SLP) Swallow Short Term Goal 1  -EC       (LTG) Swallow    (LTG) Swallow The patient will maximize swallow function for least restrictive po diet, exhibiting no complications associated with dysphagia, adequate po intake, and demonstrating independent use of safe swallow compensations.  -EC    [place] (Swallow Long Term Goal) with minimal cues (75-90% accuracy)  -EC    Time Frame (Swallow Long Term Goal) by discharge  -EC    Progress/Outcomes (Swallow Long Term Goal) new goal  -EC       (STG) Swallow 1    (STG) Swallow 1 The patient will participate in a follow up assessment to determine safety and adequacy of recommended diet, independent use of safe swallow compensations, and additional goals/recommendations to follow  -EC    [place] (Swallow Short Term Goal 1) with minimal cues (75-90% accuracy)  -EC    Time Frame (Swallow Short Term Goal 1) 1 week  -EC    Progress/Outcomes (Swallow Short Term Goal 1) new goal  -EC              User Key  (r) = Recorded By, (t) = Taken By, (c) = Cosigned By      Initials Name Effective Dates    EC Cresencio,  Carmela 06/16/21 -                     EDUCATION  The patient has been educated in the following areas:   Dysphagia (Swallowing Impairment).        SLP GOALS       Row Name 01/25/24 1500             (LTG) Swallow    (LTG) Swallow The patient will maximize swallow function for least restrictive po diet, exhibiting no complications associated with dysphagia, adequate po intake, and demonstrating independent use of safe swallow compensations.  -EC      [place] (Swallow Long Term Goal) with minimal cues (75-90% accuracy)  -EC      Time Frame (Swallow Long Term Goal) by discharge  -EC      Progress/Outcomes (Swallow Long Term Goal) new goal  -EC         (STG) Swallow 1    (STG) Swallow 1 The patient will participate in a follow up assessment to determine safety and adequacy of recommended diet, independent use of safe swallow compensations, and additional goals/recommendations to follow  -EC      [place] (Swallow Short Term Goal 1) with minimal cues (75-90% accuracy)  -EC      Time Frame (Swallow Short Term Goal 1) 1 week  -EC      Progress/Outcomes (Swallow Short Term Goal 1) new goal  -EC                User Key  (r) = Recorded By, (t) = Taken By, (c) = Cosigned By      Initials Name Provider Type    EC Carmela Dupont Speech and Language Pathologist                       Time Calculation:                Carmela Dupont  1/25/2024

## 2024-01-25 NOTE — PLAN OF CARE
Problem: Adult Inpatient Plan of Care  Goal: Plan of Care Review  Outcome: Ongoing, Progressing  Goal: Patient-Specific Goal (Individualized)  Outcome: Ongoing, Progressing  Goal: Absence of Hospital-Acquired Illness or Injury  Outcome: Ongoing, Progressing  Intervention: Identify and Manage Fall Risk  Recent Flowsheet Documentation  Taken 1/25/2024 1615 by Teetee Turcios LPN  Safety Promotion/Fall Prevention:   activity supervised   assistive device/personal items within reach   clutter free environment maintained   lighting adjusted   room organization consistent   safety round/check completed  Taken 1/25/2024 1220 by Teetee Turcios LPN  Safety Promotion/Fall Prevention:   assistive device/personal items within reach   activity supervised   clutter free environment maintained   lighting adjusted   safety round/check completed   room organization consistent  Taken 1/25/2024 0837 by Teetee Turcios LPN  Safety Promotion/Fall Prevention:   activity supervised   assistive device/personal items within reach   clutter free environment maintained   lighting adjusted   room organization consistent   safety round/check completed  Intervention: Prevent Skin Injury  Recent Flowsheet Documentation  Taken 1/25/2024 1615 by Teetee Turcios LPN  Skin Protection:   adhesive use limited   incontinence pads utilized   tubing/devices free from skin contact  Intervention: Prevent Infection  Recent Flowsheet Documentation  Taken 1/25/2024 1615 by Teetee Turcios LPN  Infection Prevention:   cohorting utilized   environmental surveillance performed   hand hygiene promoted   rest/sleep promoted   personal protective equipment utilized   single patient room provided   visitors restricted/screened  Taken 1/25/2024 1220 by Teetee Turcios LPN  Infection Prevention:   cohorting utilized   environmental surveillance performed   hand hygiene promoted   personal protective equipment utilized   single patient room provided    rest/sleep promoted   visitors restricted/screened  Taken 1/25/2024 0837 by Teetee Turcios LPN  Infection Prevention:   cohorting utilized   environmental surveillance performed   hand hygiene promoted   personal protective equipment utilized   rest/sleep promoted   single patient room provided   visitors restricted/screened  Goal: Optimal Comfort and Wellbeing  Outcome: Ongoing, Progressing  Intervention: Monitor Pain and Promote Comfort  Recent Flowsheet Documentation  Taken 1/25/2024 1615 by Teetee Turcios LPN  Pain Management Interventions:   care clustered   diversional activity provided   position adjusted   pillow support provided  Taken 1/25/2024 1220 by Teetee Turcios LPN  Pain Management Interventions:   care clustered   diversional activity provided   pillow support provided   position adjusted  Taken 1/25/2024 0837 by Teetee Turcios LPN  Pain Management Interventions:   care clustered   diversional activity provided   position adjusted   pillow support provided  Intervention: Provide Person-Centered Care  Recent Flowsheet Documentation  Taken 1/25/2024 1615 by Teetee Turcios LPN  Trust Relationship/Rapport:   care explained   choices provided  Taken 1/25/2024 0837 by Teetee Turcios LPN  Trust Relationship/Rapport:   care explained   reassurance provided  Goal: Readiness for Transition of Care  Outcome: Ongoing, Progressing     Problem: Fall Injury Risk  Goal: Absence of Fall and Fall-Related Injury  Outcome: Ongoing, Progressing  Intervention: Identify and Manage Contributors  Recent Flowsheet Documentation  Taken 1/25/2024 1615 by Teetee Turcios LPN  Medication Review/Management: medications reviewed  Taken 1/25/2024 1220 by Teetee Turcios LPN  Medication Review/Management: medications reviewed  Taken 1/25/2024 0837 by Teetee Turcios LPN  Medication Review/Management: medications reviewed  Intervention: Promote Injury-Free Environment  Recent Flowsheet Documentation  Taken 1/25/2024  1615 by Karolina, Teetee, LPN  Safety Promotion/Fall Prevention:   activity supervised   assistive device/personal items within reach   clutter free environment maintained   lighting adjusted   room organization consistent   safety round/check completed  Taken 1/25/2024 1220 by Teetee Turcios LPN  Safety Promotion/Fall Prevention:   assistive device/personal items within reach   activity supervised   clutter free environment maintained   lighting adjusted   safety round/check completed   room organization consistent  Taken 1/25/2024 0837 by Teetee Turcios LPN  Safety Promotion/Fall Prevention:   activity supervised   assistive device/personal items within reach   clutter free environment maintained   lighting adjusted   room organization consistent   safety round/check completed  Goal: Absence of Fall and Fall-Related Injury  Outcome: Ongoing, Progressing  Intervention: Identify and Manage Contributors  Recent Flowsheet Documentation  Taken 1/25/2024 1615 by Teetee Turcios LPN  Medication Review/Management: medications reviewed  Taken 1/25/2024 1220 by Teetee Turcios LPN  Medication Review/Management: medications reviewed  Taken 1/25/2024 0837 by Teetee Turcios LPN  Medication Review/Management: medications reviewed  Intervention: Promote Injury-Free Environment  Recent Flowsheet Documentation  Taken 1/25/2024 1615 by Teetee Turcios LPN  Safety Promotion/Fall Prevention:   activity supervised   assistive device/personal items within reach   clutter free environment maintained   lighting adjusted   room organization consistent   safety round/check completed  Taken 1/25/2024 1220 by Teetee Turcios LPN  Safety Promotion/Fall Prevention:   assistive device/personal items within reach   activity supervised   clutter free environment maintained   lighting adjusted   safety round/check completed   room organization consistent  Taken 1/25/2024 0837 by Teetee Turcios LPN  Safety Promotion/Fall Prevention:    activity supervised   assistive device/personal items within reach   clutter free environment maintained   lighting adjusted   room organization consistent   safety round/check completed     Problem: Adjustment to Illness (Sepsis/Septic Shock)  Goal: Optimal Coping  Outcome: Ongoing, Progressing  Intervention: Optimize Psychosocial Adjustment to Illness  Recent Flowsheet Documentation  Taken 1/25/2024 1615 by Teetee Turcios LPN  Family/Support System Care:   support provided   self-care encouraged  Taken 1/25/2024 0837 by Teetee Turcios LPN  Family/Support System Care: support provided     Problem: Bleeding (Sepsis/Septic Shock)  Goal: Absence of Bleeding  Outcome: Ongoing, Progressing     Problem: Glycemic Control Impaired (Sepsis/Septic Shock)  Goal: Blood Glucose Level Within Desired Range  Outcome: Ongoing, Progressing  Intervention: Optimize Glycemic Control  Recent Flowsheet Documentation  Taken 1/25/2024 1615 by Teetee Turcios LPN  Glycemic Management: blood glucose monitored  Taken 1/25/2024 0837 by Teetee Turcios LPN  Glycemic Management: blood glucose monitored     Problem: Infection Progression (Sepsis/Septic Shock)  Goal: Absence of Infection Signs and Symptoms  Outcome: Ongoing, Progressing  Intervention: Initiate Sepsis Management  Recent Flowsheet Documentation  Taken 1/25/2024 1615 by Teetee Turcios LPN  Infection Prevention:   cohorting utilized   environmental surveillance performed   hand hygiene promoted   rest/sleep promoted   personal protective equipment utilized   single patient room provided   visitors restricted/screened  Isolation Precautions: precautions maintained  Taken 1/25/2024 1220 by Teetee Turcios LPN  Infection Prevention:   cohorting utilized   environmental surveillance performed   hand hygiene promoted   personal protective equipment utilized   single patient room provided   rest/sleep promoted   visitors restricted/screened  Isolation Precautions: precautions  maintained  Taken 1/25/2024 0837 by Teetee Turcios LPN  Infection Prevention:   cohorting utilized   environmental surveillance performed   hand hygiene promoted   personal protective equipment utilized   rest/sleep promoted   single patient room provided   visitors restricted/screened  Isolation Precautions: precautions maintained  Intervention: Promote Stabilization  Recent Flowsheet Documentation  Taken 1/25/2024 1615 by Teetee Turcios LPN  Fluid/Electrolyte Management: fluids provided     Problem: Nutrition Impaired (Sepsis/Septic Shock)  Goal: Optimal Nutrition Intake  Outcome: Ongoing, Progressing     Problem: Skin Injury Risk Increased  Goal: Skin Health and Integrity  Outcome: Ongoing, Progressing  Intervention: Optimize Skin Protection  Recent Flowsheet Documentation  Taken 1/25/2024 1615 by Teetee Turcios LPN  Pressure Reduction Techniques:   frequent weight shift encouraged   weight shift assistance provided  Pressure Reduction Devices: pressure-redistributing mattress utilized  Skin Protection:   adhesive use limited   incontinence pads utilized   tubing/devices free from skin contact     Problem: Behavioral Health Comorbidity  Goal: Maintenance of Behavioral Health Symptom Control  Outcome: Ongoing, Progressing  Intervention: Maintain Behavioral Health Symptom Control  Recent Flowsheet Documentation  Taken 1/25/2024 1615 by Teetee Turcios LPN  Medication Review/Management: medications reviewed  Taken 1/25/2024 1220 by Teetee Turcios LPN  Medication Review/Management: medications reviewed  Taken 1/25/2024 0837 by Teetee Turcios LPN  Medication Review/Management: medications reviewed     Problem: Malnutrition  Goal: Improved Nutritional Intake  Outcome: Ongoing, Progressing   Goal Outcome Evaluation:           Patient was seen by speech therapy this shift with new orders received & noted for pureed diet with thin liquids. Patient given thin liquids per this nurse this shift after order  received. Patient blood sugars monitored by staff. Patient shows no s/s of pain or discomfort noted. Call light within reach.

## 2024-01-25 NOTE — DISCHARGE PLACEMENT REQUEST
"Cha Arce (88 y.o. Male)       Date of Birth   1935    Social Security Number       Address   [address] [address]    Home Phone   [phone]    MRN   [id number]       [facility]   [facility]    Marital Status                               Admission Date   1/21/24    Admission Type   Emergency    Admitting Provider   Max Falcon MD    Attending Provider   Yasir Allen MD    Department, Room/Bed   [facility] 2D, 270/1       Discharge Date       Discharge Disposition       Discharge Destination                                 Attending Provider: Yasir Allen MD    Allergies: No Known Allergies    Isolation: Contact   Infection: Candida Auris (rule out) (01/21/24)   Code Status: No CPR    Ht: 185.4 cm (73\")   Wt: 70.5 kg (155 lb 6.8 oz)    Admission Cmt: None   Principal Problem: Severe sepsis [A41.9,R65.20]                   Active Insurance as of 1/21/2024       Primary Coverage       Payor Plan Insurance Group Employer/Plan Group    MEDICARE MEDICARE A & B        Payor Plan Address Payor Plan Phone Number Payor Plan Fax Number Effective Dates    [address] [phone]  2/1/1995 - None Entered    [facility] [address]         Subscriber Name Subscriber Birth Date Member ID       CHA ARCE 1935 [id number]               Secondary Coverage       Payor Plan Insurance Group Employer/Plan Group    [employer]  SUP AAR HEALTH CARE OPTIONS        Payor Plan Address Payor Plan Phone Number Payor Plan Fax Number Effective Dates    [facility] [phone]  1/1/2019 - None Entered    [address]       [facility] [address]         Subscriber Name Subscriber Birth Date Member ID       CHA ARCE 1935 [id number]               Tertiary Coverage       Payor Plan Insurance Group Employer/Plan Group    [place]        Payor Plan Address Payor Plan Phone Number Payor Plan Fax Number Effective Dates    [address]   1/21/2024 " - None Entered    [place] [address]         Subscriber Name Subscriber Birth Date Member ID       CHA MATTA 1935 [id number]                     Emergency Contacts        (Rel.) Home Phone Work Phone Mobile Phone    ANTIONETTE MATTA (Spouse) [phone] -- [phone]    moiz matta (Son) -- -- [phone]                 History & Physical        Lindsay Boland APRN at 24       Attestation signed by Max Falcon MD at 24 1451      Attending Addendum     Subjective: Admitted overnight with severe sepsis and JACQUI.    Exam: Somnolent, unarousable.    Assessment / Plan:   Severe sepsis due to UTI: DC vancomycin, de-escalate antibiotics to ceftriaxone alone.  Acute kidney injury/hypernatremia: Change IV fluids to D5 half NS.  Hyperkalemia resolved.    I have personally reviewed all labs and other data, reviewed all other pertinent notes, interviewed and examined this patient today.     Dr. Max Falcon MD MPH  Staff Intensivist  24   14:50 EST                          Critical Care History and Physical     Cha Matta : 1935 MRN:[id number] LOS:0 ROOM: [facility]/     Reason for admission: Severe sepsis     Assessment / Plan     Severe Sepsis: ( WBC>12 or <4 or >10% bands, Temp > 100.4 or < 96.8, HR>90, Lactic acid>2, and Creatinine >2 )  Likely due to urosepsis.   Will do blood / urine cultures.   Started on cefepime and vancomycin x 1 dose. MRSA pending.    In ER patient given total of 3,274 ml per sepsis + 1L   Cefepime and Van x 1 also given in ER  Per chart review, last echo on 19 EF 47% with diastolic dysfunction. Cautious additional fluid resuscitation as needed.   Initial lactic 3.9 --> 5.5, Procal 0.35    Metabolic encephalopathy  CT head -- global atrophy, negative for any acute findings  Monitor neuro status per ICU protocol     Hypernatremia   Likely from insensible losses + decreased oral intake  Free Water Deficit in  Hypernatremia - MDCalc  Calculated on Jan 21 2024 8:28 PM  5.7 L -> Free Water Deficit  D5W at 100 cc/ml   Recheck BMP    Acute kidney injury  Likely 2/2 dehydration  Baseline creatine 1.1  IVF for fluid resuscitation  Blood pressure currently stable  Monitor Is/Os  Monitor electrolytes  Net IO Since Admission: 150 mL [01/22/24 0117]    Hyperkalemia  Patient given half amp of D50 + insulin 5 units + 1g Calcium   Recheck potassium 5.4  Repeat BMP     Hypoglycemia  D5W at 100 cc/ml  Monitor glucose q2h until stable    Severe malnutrition  Inpatient nutrition consult  Family deciding about feeding tube placement    Chronic:  Diastolic heart failure -- Echo pending for in AM. Currently not in exacerbation.   Essential hypertension -- Hold home losartan for now due to NPO and normotensive  Depression/anxiety -- Continue home celexa, ativan, remeron, risperdone, when clinically appropriate    Code Status (Patient has no pulse and is not breathing): CPR (Attempt to Resuscitate)  Medical Interventions (Patient has pulse or is breathing): Full Support       Nutrition:   NPO Diet NPO Type: Strict NPO     DVT prophylaxis:  Mechanical DVT prophylaxis orders are present.     History of Present illness     Cookie Larsen is a 88 y.o. male with PMH of CHF, HTN, depression, anxiety, alcohol abuse, presented to the hospital for altered mental status, and was admitted with a principal diagnosis of Severe sepsis.     Per patient's family, the patient has not been eating or drinking for about three days. Per ER documentation, nursing home reported a steady decline over the patient couple of weeks.     ED course: Patient given sepsis bolus plus an additional liter of IVF. Blood culture drawn. Patient was given a dose of cefepime. UA positive for leukocytes, negative for nitrates. , creatine 4.21, sodium elevated at 161, potassium elevated at 6.1. Patient at cruz risk for decompensation due to severe sepsis. Will be monitored  overnight in ICU.     ACP: No CPR, No intubation. Patient has no ACP docs on file. I discussed code status with patient's wife, daughter, and son at bedside. Wife is NOK to make decisions for patient.     Patient was seen and examined on 01/21/24 at 20:08 EST .    Subjective / Review of systems     Review of Systems   Unable to perform ROS: Mental status change        Past Medical/Surgical/Social/Family History & Allergies     Past Medical History:   Diagnosis Date    Depression     GONZALES (dyspnea on exertion)     Glaucoma     Gout     Hypertension     IFG (impaired fasting glucose)     NICM (nonischemic cardiomyopathy)     Vitamin D deficiency       Past Surgical History:   Procedure Laterality Date    CARDIAC CATHETERIZATION N/A 3/13/2019    Procedure: Coronary angiography;  Surgeon: Jovita Crews MD;  Location:  [facility];  Service: Cardiology    CARDIAC CATHETERIZATION N/A 3/13/2019    Procedure: Left Heart Cath;  Surgeon: Jovita Crews MD;  Location:  [facility];  Service: Cardiology    CARDIAC CATHETERIZATION N/A 3/13/2019    Procedure: Left ventriculography;  Surgeon: Jovita Crews MD;  Location:  [facility];  Service: Cardiology    EAR MASTOIDECTOMY W/ COCHLEAR IMPLANT W/ LANDMARK Right     HEMORRHOIDECTOMY        Social History     Socioeconomic History    Marital status:    Tobacco Use    Smoking status: Never    Smokeless tobacco: Never    Tobacco comments:     No Caffeine use   Substance and Sexual Activity    Alcohol use: Yes     Comment: 1-2 daily, caffiene: iced tea 6-8oz daily     Drug use: No    Sexual activity: Defer      History reviewed. No pertinent family history.   No Known Allergies   Social Determinants of Health     Tobacco Use: Low Risk  (1/21/2024)    Patient History     Smoking Tobacco Use: Never     Smokeless Tobacco Use: Never     Passive Exposure: Not on file   Alcohol Use: Not on file   Financial Resource Strain: Not on  file   Food Insecurity: Not on file   Transportation Needs: Not on file   Physical Activity: Not on file   Stress: Not on file   Social Connections: Unknown (10/10/2023)    Family and Community Support     Help with Day-to-Day Activities: Not on file     Lonely or Isolated: Not on file   Interpersonal Safety: Unknown (10/10/2023)    Abuse Screen     Unsafe at Home or Work/School: Not on file     Feels Threatened by Someone?: Not on file     Does Anyone Keep You from Contacting Others or Doint Things Outside the Home?: Not on file     Physical Sign of Abuse Present: Not on file   Depression: Not on file   Housing Stability: Unknown (10/10/2023)    Housing Stability     Current Living Arrangements: Not on file     Potentially Unsafe Housing Conditions: Not on file   Utilities: Not on file   Health Literacy: Unknown (10/10/2023)    Education     Help with school or training?: Not on file     Preferred Language: Not on file   Employment: Unknown (10/10/2023)    Employment     Do you want help finding or keeping work or a job?: Not on file   Disabilities: Unknown (10/10/2023)    Disabilities     Concentrating, Remembering, or Making Decisions Difficulty: Not on file     Doing Errands Independently Difficulty: Not on file        Home Medications     Prior to Admission medications    Medication Sig Start Date End Date Taking? Authorizing Provider   allopurinol (ZYLOPRIM) 300 MG tablet Take 1 tablet by mouth Daily.   Yes Christofer Mcneill MD   citalopram (CeleXA) 20 MG tablet Take 1 tablet by mouth Daily.   Yes Christofer Mcneill MD   divalproex (DEPAKOTE) 125 MG DR tablet Take 2 tablets by mouth 2 (Two) Times a Day.   Yes Christofer Mcneill MD   docusate sodium (COLACE) 100 MG capsule Take 1 capsule by mouth 2 (Two) Times a Day.   Yes Christofer Mcneill MD   famotidine (PEPCID) 10 MG tablet Take 1 tablet by mouth 2 (Two) Times a Day.   Yes Christofer Mcneill MD   latanoprost (XALATAN) 0.005 % ophthalmic  solution Administer 1 drop to both eyes Every Night.   Yes Christofer Mcneill MD   LORazepam (ATIVAN) 0.5 MG tablet Take 1 tablet by mouth 2 (Two) Times a Day.   Yes Christofer Mcneill MD   losartan (COZAAR) 100 MG tablet Take 1 tablet by mouth Daily.   Yes Christofer Mcneill MD   mirtazapine (REMERON) 15 MG tablet Take 1 tablet by mouth Daily.   Yes Christofer Mcneill MD   risperiDONE (risperDAL) 0.25 MG tablet Take 1 tablet by mouth Every Night.   Yes Christofer Mcneill MD   tamsulosin (FLOMAX) 0.4 MG capsule 24 hr capsule Take 1 capsule by mouth Daily.   Yes Christofer Mcneill MD   traMADol (ULTRAM) 50 MG tablet Take 1 tablet by mouth 2 (Two) Times a Day.   Yes Christofer Mcneill MD   aspirin 81 MG EC tablet Take 81 mg by mouth Daily.  1/21/24  Christofer Mcneill MD   brimonidine (ALPHAGAN) 0.2 % ophthalmic solution Administer 1 drop to both eyes 3 (Three) Times a Day.  1/21/24  Christofer Mcneill MD   Diclofenac Sodium (VOLTAREN) 1 % gel gel diclofenac 1 % topical gel  1/21/24  Christofer Mcneill MD   fluticasone-salmeterol (ADVAIR HFA) 115-21 MCG/ACT inhaler Inhale 2 puffs 2 (Two) Times a Day.  1/21/24  Christofer Mcneill MD   irbesartan (AVAPRO) 150 MG tablet Take 150 mg by mouth Daily.  1/21/24  Christofer Mcneill MD   QUEtiapine (SEROquel) 25 MG tablet TK 2 TS PO QAM. MAY TAKE 1 T PO DURING THE DAY IF NEEDED FOR AGITATION OR ANXIETY 11/25/20 1/21/24  Christofer Mcneill MD   sertraline (ZOLOFT) 25 MG tablet Take 25 mg by mouth Daily. 2/25/19 1/21/24  Christofer Mcneill MD   sulfamethoxazole-trimethoprim (BACTRIM DS,SEPTRA DS) 800-160 MG per tablet sulfamethoxazole 800 mg-trimethoprim 160 mg tablet   TAKE 1 TABLET BY MOUTH EVERY 12 HOURS  1/21/24  Christofer Mcneill MD        Objective / Physical Exam     Vital signs:  Temp: 98.2 °F (36.8 °C)  BP: 95/47  Heart Rate: 95  Resp: 20  SpO2: 98 %  Weight:  (unable to assess- bed scale broken)    Admission Weight: Weight:  98 kg (216 lb)    Physical Exam  Vitals and nursing note reviewed.   Constitutional:       General: He is not in acute distress.     Appearance: He is ill-appearing.   HENT:      Head: Normocephalic.      Mouth/Throat:      Mouth: Mucous membranes are dry.      Pharynx: Oropharynx is clear.   Eyes:      Conjunctiva/sclera: Conjunctivae normal.      Pupils: Pupils are equal, round, and reactive to light.   Cardiovascular:      Rate and Rhythm: Regular rhythm. Tachycardia present.      Pulses: Normal pulses.      Heart sounds: Normal heart sounds.   Pulmonary:      Effort: Pulmonary effort is normal. No respiratory distress.      Breath sounds: Rhonchi present.   Abdominal:      General: Bowel sounds are normal.      Palpations: Abdomen is soft.   Musculoskeletal:      Right lower leg: No edema.      Left lower leg: No edema.   Skin:     General: Skin is warm and dry.      Findings: No rash.   Neurological:      General: No focal deficit present.      Mental Status: He is lethargic and disoriented.          Labs     Results from last 7 days   Lab Units 01/21/24  1533   WBC 10*3/mm3 13.00*   HEMATOCRIT % 38.6   PLATELETS 10*3/mm3 374      Results from last 7 days   Lab Units 01/21/24  1859 01/21/24  1627   SODIUM mmol/L  --  161*   POTASSIUM mmol/L 5.4* 6.1*   CHLORIDE mmol/L  --  124*   CO2 mmol/L  --  23.0   BUN mg/dL  --  150*   CREATININE mg/dL  --  4.21*        Imaging     CT Head Without Contrast    Result Date: 1/21/2024  Impression: 1.Global atrophy. 2.White matter changes which could reflect more chronic small vessel ischemic change 3.Evidence for previous mastoid surgery 4.Suggested cochlear implant. Electronically Signed: Salazar Flores MD  1/21/2024 6:05 PM EST  Workstation ID: [id number]    XR Chest 1 View    Result Date: 1/21/2024  Impression: 1.An acute pulmonary process is not apparent. Electronically Signed: Salazar Flores MD  1/21/2024 3:36 PM EST  Workstation ID: [id number]     Current Medications      Scheduled Meds:  [START ON 2024] cefepime, 2,000 mg, Intravenous, Q24H  [START ON 2024] pantoprazole, 40 mg, Intravenous, Q AM  sodium chloride, 10 mL, Intravenous, Q12H  vancomycin, 1,500 mg, Intravenous, Once         Continuous Infusions:  dextrose, 100 mL/hr, Last Rate: 100 mL/hr (24)  Pharmacy to Dose Cefepime,            ALLISON Rueda   Critical Care  24   20:08 EST      Electronically signed by Max Falcon MD at 24 1451          Physician Progress Notes (last 24 hours)        Yasir Allen MD at 24 1521              [facility] MEDICINE SERVICE  DAILY PROGRESS NOTE    NAME: Cookie Larsen  : 1935  MRN: [id number]      LOS: 3 days     PROVIDER OF SERVICE: Yasir Allen MD    Chief Complaint: Severe sepsis  Cookie Larsen is a 88 y.o. male with PMH of CHF, HTN, depression, anxiety, alcohol abuse, presented to the hospital for altered mental status, and was admitted with a principal diagnosis of Severe sepsis.   Subjective:     Interval History:  History taken from: patient RN  Patient Complaints: Poorly communicating poorly verbal.  Overnight events noted palliative care team discussed with family awaiting decision on hospice  Patient Denies: unable to obtain patient nonverbal    Review of Systems:   Review of Systems  Unable to obtain patient nonverbal  Objective:     Vital Signs  Temp:  [97.2 °F (36.2 °C)-101.2 °F (38.4 °C)] 97.2 °F (36.2 °C)  Heart Rate:  [] 108  Resp:  [13-24] 13  BP: (100-143)/(49-97) 132/55  Flow (L/min):  [2] 2   Body mass index is 20.51 kg/m².    Physical Exam  Physical Exam  Constitutional:       Comments: Lethargic   HENT:      Head: Atraumatic.      Mouth/Throat:      Mouth: Mucous membranes are dry.   Eyes:      Pupils: Pupils are equal, round, and reactive to light.   Cardiovascular:      Rate and Rhythm: Normal rate and regular rhythm.      Pulses: Normal pulses.      Heart sounds: Normal heart  sounds.   Pulmonary:      Effort: Pulmonary effort is normal.      Breath sounds: Normal breath sounds.      Comments: On oxygen supplementation 2 LPM  Abdominal:      General: Bowel sounds are normal.      Palpations: Abdomen is soft.   Musculoskeletal:         General: Normal range of motion.      Cervical back: Neck supple.   Skin:     General: Skin is warm.   Neurological:      Comments: Lethargic, able to move extremities response to noxious stimuli         Scheduled Meds   cefTRIAXone, 1,000 mg, Intravenous, Q24H  heparin (porcine), 5,000 Units, Subcutaneous, Q8H  pantoprazole, 40 mg, Intravenous, Q AM  sodium chloride, 10 mL, Intravenous, Q12H       PRN Meds     acetaminophen    dextrose    dextrose    glucagon (human recombinant)    nitroglycerin    ondansetron ODT **OR** ondansetron    [COMPLETED] Insert Peripheral IV **AND** sodium chloride    sodium chloride    sodium chloride   Infusions  dextrose 5 % and sodium chloride 0.45 %, 125 mL/hr, Last Rate: 125 mL/hr (01/24/24 0849)          Diagnostic Data    Results from last 7 days   Lab Units 01/24/24  0913 01/21/24  1859 01/21/24  1627   WBC 10*3/mm3 11.60*   < >  --    HEMOGLOBIN g/dL 9.8*   < >  --    HEMATOCRIT % 31.4*   < >  --    PLATELETS 10*3/mm3 262   < >  --    GLUCOSE mg/dL 87   < > 128*   CREATININE mg/dL 1.32*   < > 4.21*   BUN mg/dL 57*   < > 150*   SODIUM mmol/L 158*   < > 161*   POTASSIUM mmol/L 4.1   < > 6.1*   AST (SGOT) U/L  --   --  33   ALT (SGPT) U/L  --   --  28   ALK PHOS U/L  --   --  76   BILIRUBIN mg/dL  --   --  0.4   ANION GAP mmol/L 6.0   < > 14.0    < > = values in this interval not displayed.       No radiology results for the last day      I reviewed the patient's new clinical results.    Assessment/Plan:     Active and Resolved Problems  Acute metabolic encephalopathy  History of dementia  Severe sepsis sepsis present on admission  ( WBC>12 or <4 or >10% bands, Temp > 100.4 or < 96.8, HR>90, Lactic acid>2, and Creatinine >2  )  Likely due to urosepsis.   Follow blood culture / urine cultures, NGTD  Received cefepime, de-escalated to ceftriaxone monotherapy. Received vancomycin x 1 dose. MRSA negative.    In ER patient given total of 3,274 ml per sepsis + 1L   Per chart review, last echo on 2/27/19 EF 47% with diastolic dysfunction, follow up echo this admission shows normal EF.   Initial lactic 3.9 --> 5.5, Procal 0.35  CT head -- global atrophy, negative for any acute findings, minimal improvement thus far    Hypernatremia   Likely from insensible losses + decreased oral intake  Slowly improving with fluid resuscitation.  IVF changed to D5 1/2NS     Acute kidney injury  Likely 2/2 dehydration. Slowly improving with IVF resuscitation  Baseline creatine 1.1  Monitor Is/Os  Monitor electrolytes     Hyperkalemia-resolved     Hypoglycemia-resolved  Continue IVF  Accuchecks with hypoglycemia protocol in place     Severe malnutrition  Inpatient nutrition consult  Family declined feeding tube  NPO pending SLP evaluation if his mentation improves enough to engage     Chronic:  Diastolic heart failure -- Currently compensated. Echo 1/22/23 EF 60%   Essential hypertension -- Hold home losartan for now due to NPO and normotensive  Depression/anxiety -- Continue home celexa, ativan, remeron, risperdone, when clinically appropriate--currently NPO     Disposition:  [facility] enough to [facility]--consideration for comfort care/hospice if he doesn    DVT prophylaxis:  Medical and mechanical DVT prophylaxis orders are present.         Code status is   Code Status and Medical Interventions:   Ordered at: 01/21/24 1881     Medical Intervention Limits:    NO intubation (DNI)     Code Status (Patient has no pulse and is not breathing):    No CPR (Do Not Attempt to Resuscitate)     Medical Interventions (Patient has pulse or is breathing):    Limited Support     Comments:    Discussed with patient, wife,  daughter, and son       Plan for disposition - pending family decision for hospice     Time: 32 minutes    Signature: Electronically signed by Yasir Allen MD, 01/24/24, 15:21 EST.  [facility] Hospitalist Team     Electronically signed by Yasir Allen MD at 01/24/24 1526       Consult Notes (last 24 hours)  Notes from 01/24/24 0854 through 01/25/24 0854   No notes of this type exist for this encounter.       Occupational Therapy Notes (last 24 hours)  Notes from 01/24/24 0854 through 01/25/24 0854   No notes exist for this encounter.       Speech Language Pathology Notes (last 24 hours)  Notes from 01/24/24 0854 through 01/25/24 0854   No notes exist for this encounter.

## 2024-01-26 VITALS
SYSTOLIC BLOOD PRESSURE: 140 MMHG | HEART RATE: 101 BPM | RESPIRATION RATE: 16 BRPM | WEIGHT: 164.68 LBS | DIASTOLIC BLOOD PRESSURE: 64 MMHG | BODY MASS INDEX: 21.83 KG/M2 | TEMPERATURE: 97.5 F | HEIGHT: 73 IN | OXYGEN SATURATION: 98 %

## 2024-01-26 PROBLEM — R65.20 SEVERE SEPSIS: Status: RESOLVED | Noted: 2024-01-21 | Resolved: 2024-01-26

## 2024-01-26 PROBLEM — A41.9 SEVERE SEPSIS: Status: RESOLVED | Noted: 2024-01-21 | Resolved: 2024-01-26

## 2024-01-26 LAB
ANION GAP SERPL CALCULATED.3IONS-SCNC: 10 MMOL/L (ref 5–15)
BACTERIA ISLT: NORMAL
BACTERIA SPEC AEROBE CULT: NORMAL
BACTERIA SPEC AEROBE CULT: NORMAL
BASOPHILS # BLD AUTO: 0 10*3/MM3 (ref 0–0.2)
BASOPHILS NFR BLD AUTO: 0.2 % (ref 0–1.5)
BUN SERPL-MCNC: 38 MG/DL (ref 8–23)
BUN/CREAT SERPL: 28.6 (ref 7–25)
CALCIUM SPEC-SCNC: 8.7 MG/DL (ref 8.6–10.5)
CHLORIDE SERPL-SCNC: 127 MMOL/L (ref 98–107)
CO2 SERPL-SCNC: 20 MMOL/L (ref 22–29)
CREAT SERPL-MCNC: 1.33 MG/DL (ref 0.76–1.27)
DEPRECATED RDW RBC AUTO: 58.2 FL (ref 37–54)
EGFRCR SERPLBLD CKD-EPI 2021: 51.4 ML/MIN/1.73
EOSINOPHIL # BLD AUTO: 0.4 10*3/MM3 (ref 0–0.4)
EOSINOPHIL NFR BLD AUTO: 3.7 % (ref 0.3–6.2)
ERYTHROCYTE [DISTWIDTH] IN BLOOD BY AUTOMATED COUNT: 18 % (ref 12.3–15.4)
GLUCOSE BLDC GLUCOMTR-MCNC: 115 MG/DL (ref 70–105)
GLUCOSE BLDC GLUCOMTR-MCNC: 118 MG/DL (ref 70–105)
GLUCOSE SERPL-MCNC: 114 MG/DL (ref 65–99)
HCT VFR BLD AUTO: 30.2 % (ref 37.5–51)
HGB BLD-MCNC: 9.5 G/DL (ref 13–17.7)
LYMPHOCYTES # BLD AUTO: 1 10*3/MM3 (ref 0.7–3.1)
LYMPHOCYTES NFR BLD AUTO: 9.7 % (ref 19.6–45.3)
MAGNESIUM SERPL-MCNC: 2 MG/DL (ref 1.6–2.4)
MCH RBC QN AUTO: 27.5 PG (ref 26.6–33)
MCHC RBC AUTO-ENTMCNC: 31.3 G/DL (ref 31.5–35.7)
MCV RBC AUTO: 87.9 FL (ref 79–97)
MONOCYTES # BLD AUTO: 0.8 10*3/MM3 (ref 0.1–0.9)
MONOCYTES NFR BLD AUTO: 8.3 % (ref 5–12)
NEUTROPHILS NFR BLD AUTO: 7.6 10*3/MM3 (ref 1.7–7)
NEUTROPHILS NFR BLD AUTO: 78.1 % (ref 42.7–76)
NRBC BLD AUTO-RTO: 0.1 /100 WBC (ref 0–0.2)
PLATELET # BLD AUTO: 224 10*3/MM3 (ref 140–450)
PMV BLD AUTO: 9.9 FL (ref 6–12)
POTASSIUM SERPL-SCNC: 3.6 MMOL/L (ref 3.5–5.2)
RBC # BLD AUTO: 3.44 10*6/MM3 (ref 4.14–5.8)
SODIUM SERPL-SCNC: 157 MMOL/L (ref 136–145)
WBC NRBC COR # BLD AUTO: 9.8 10*3/MM3 (ref 3.4–10.8)

## 2024-01-26 PROCEDURE — 80048 BASIC METABOLIC PNL TOTAL CA: CPT | Performed by: NURSE PRACTITIONER

## 2024-01-26 PROCEDURE — 25010000002 HEPARIN (PORCINE) PER 1000 UNITS

## 2024-01-26 PROCEDURE — 25010000002 CEFTRIAXONE PER 250 MG: Performed by: INTERNAL MEDICINE

## 2024-01-26 PROCEDURE — 82948 REAGENT STRIP/BLOOD GLUCOSE: CPT | Performed by: STUDENT IN AN ORGANIZED HEALTH CARE EDUCATION/TRAINING PROGRAM

## 2024-01-26 PROCEDURE — 83735 ASSAY OF MAGNESIUM: CPT | Performed by: NURSE PRACTITIONER

## 2024-01-26 PROCEDURE — 85025 COMPLETE CBC W/AUTO DIFF WBC: CPT | Performed by: NURSE PRACTITIONER

## 2024-01-26 RX ORDER — HYDROMORPHONE HYDROCHLORIDE 2 MG/1
2 TABLET ORAL EVERY 4 HOURS PRN
Qty: 42 TABLET | Refills: 0 | Status: SHIPPED | OUTPATIENT
Start: 2024-01-26 | End: 2024-02-02

## 2024-01-26 RX ORDER — ONDANSETRON 4 MG/1
4 TABLET, ORALLY DISINTEGRATING ORAL EVERY 8 HOURS PRN
Start: 2024-01-26

## 2024-01-26 RX ORDER — ALBUMIN (HUMAN) 12.5 G/50ML
12.5 SOLUTION INTRAVENOUS ONCE
Status: DISCONTINUED | OUTPATIENT
Start: 2024-01-26 | End: 2024-01-26

## 2024-01-26 RX ORDER — ACETAMINOPHEN 650 MG/1
650 SUPPOSITORY RECTAL EVERY 4 HOURS PRN
Start: 2024-01-26

## 2024-01-26 RX ADMIN — PANTOPRAZOLE SODIUM 40 MG: 40 INJECTION, POWDER, FOR SOLUTION INTRAVENOUS at 05:31

## 2024-01-26 RX ADMIN — HEPARIN SODIUM 5000 UNITS: 5000 INJECTION, SOLUTION INTRAVENOUS; SUBCUTANEOUS at 05:32

## 2024-01-26 RX ADMIN — Medication 10 ML: at 08:58

## 2024-01-26 RX ADMIN — CEFTRIAXONE 1000 MG: 1 INJECTION, POWDER, FOR SOLUTION INTRAMUSCULAR; INTRAVENOUS at 08:58

## 2024-01-26 NOTE — CASE MANAGEMENT/SOCIAL WORK
Case Management Discharge Note                Selected Continued Care - Discharged on 1/26/2024 Admission date: 1/21/2024 - Discharge disposition: Hospice/Medical Facility (DC - External)      Destination Coordination complete.      Service Provider Selected Services Address Phone Fax Patient Preferred    [facility] [address] [place] [address] [phone] --              Home Medical Care Coordination complete.      Service Provider Selected Services Address Phone Fax Patient Preferred    [facility] [address] [place] [id number] [phone] --                Final Discharge Disposition Code: 04 - intermediate care facility

## 2024-01-26 NOTE — DISCHARGE SUMMARY
"[facility]  Discharge Summary    Date of Service: 24    Patient Name: Cookie Larsen  : 1935  MRN: [id number]    Date of Admission: 2024  Discharge Diagnosis: severe sepsis, metabolic encephalopathy, hospice care   Date of Discharge:  24    Primary Care Physician: Giancarlo Hogan MD      Presenting Problem:   Acute metabolic encephalopathy  History of dementia  Severe sepsis sepsis present on admission  Hypernatremia   Acute kidney injury  Hyperkalemia-resolved   Hypoglycemia-resolved  Severe malnutrition    Diastolic heart failure -- Currently compensated. Echo 23 EF 60%   Essential hypertension -- Hold home losartan for now due to NPO and normotensive  Depression/anxiety -- Continue home celexa, ativan, remeron, risperdone, when clinically appropriate--currently NP        Hospital Course     HPI:  Per the H&P  \"Cookie Larsen is a 88 y.o. male with PMH of CHF, HTN, depression, anxiety, alcohol abuse, presented to the hospital for altered mental status, and was admitted with a principal diagnosis of Severe sepsis.      Per patient's family, the patient has not been eating or drinking for about three days. Per ER documentation, nursing home reported a steady decline over the patient couple of weeks.      ED course: Patient given sepsis bolus plus an additional liter of IVF. Blood culture drawn. Patient was given a dose of cefepime. UA positive for leukocytes, negative for nitrates. , creatine 4.21, sodium elevated at 161, potassium elevated at 6.1. Patient at cruz risk for decompensation due to severe sepsis. Will be monitored overnight in ICU.      ACP: No CPR, No intubation. Patient has no ACP docs on file. I discussed code status with patient's wife, daughter, and son at bedside. Wife is NOK to make decisions for patient.     Hospital Course:  Patient had prolonged course in the hospital initially was admitted to the ICU " requiring pressors as well as given antibiotics ;was weaned off however patient general condition deteriorated palliative care team was consulted and goals of care discussed with the family and transition to hospice care        DISCHARGE Follow Up Recommendations for labs and diagnostics:   Hospice care    Reasons For Change In Medications and Indications for New Medications:      Day of Discharge     Vital Signs:  Temp:  [98.1 °F (36.7 °C)-99.9 °F (37.7 °C)] 98.9 °F (37.2 °C)  Heart Rate:  [] 97  Resp:  [16-30] 16  BP: (123-139)/(67-79) 139/70  Flow (L/min):  [2] 2    Physical Exam:  Physical Exam   Constitutional:       Comments: Lethargic   HENT:      Head: Atraumatic.      Mouth/Throat:      Mouth: Mucous membranes are dry.   Eyes:      Pupils: Pupils are equal, round, and reactive to light.   Cardiovascular:      Rate and Rhythm: Normal rate and regular rhythm.      Pulses: Normal pulses.      Heart sounds: Normal heart sounds.   Pulmonary:      Effort: Pulmonary effort is normal.      Breath sounds: Normal breath sounds.      Comments: On oxygen supplementation 2 LPM  Abdominal:      General: Bowel sounds are normal.      Palpations: Abdomen is soft.   Musculoskeletal:         General: Normal range of motion.      Cervical back: Neck supple.   Skin:     General: Skin is warm.   Neurological:      Comments: Lethargic, able to move extremities response to noxious stimuli       Pertinent  and/or Most Recent Results     LAB RESULTS:      Lab 01/26/24  0156 01/25/24  0146 01/24/24  0913 01/23/24  1245 01/22/24  0712 01/21/24 2008 01/21/24  1859 01/21/24  1541   WBC 9.80 10.70 11.60* 12.00* 10.10  --   --   --    HEMOGLOBIN 9.5* 9.5* 9.8* 10.3* 8.9*  --   --   --    HEMATOCRIT 30.2* 30.6* 31.4* 33.2* 27.9*  --   --   --    PLATELETS 224 225 262 289 253  --   --   --    NEUTROS ABS 7.60* 8.60* 9.90* 10.30* 8.50*  --   --   --    LYMPHS ABS 1.00 0.90 0.80 0.80 0.90  --   --   --    MONOS ABS 0.80 0.80 0.70 0.70  0.70  --   --   --    EOS ABS 0.40 0.40 0.20 0.10 0.00  --   --   --    MCV 87.9 89.3 89.0 88.7 87.7  --   --   --    PROCALCITONIN  --   --   --   --   --  0.35*  --   --    LACTATE  --   --   --   --   --   --  5.5* 3.9*         Lab 01/26/24  0156 01/25/24  0146 01/24/24  0913 01/23/24 1245 01/22/24 0712 01/21/24 1859 01/21/24  1627   SODIUM 157* 157* 158* 159* 150*   < > 161*   POTASSIUM 3.6 3.5 4.1 4.0 4.2   < > 6.1*   CHLORIDE 127* 128* 129* 127* 118*   < > 124*   CO2 20.0* 21.0* 23.0 23.0 19.0*   < > 23.0   ANION GAP 10.0 8.0 6.0 9.0 13.0   < > 14.0   BUN 38* 49* 57* 76* 113*   < > 150*   CREATININE 1.33* 1.37* 1.32* 1.64* 2.72*   < > 4.21*   EGFR 51.4* 49.6* 51.9* 40.0* 21.8*   < > 12.9*   GLUCOSE 114* 106* 87 107* 325*   < > 128*   CALCIUM 8.7 8.3* 9.0 9.2 8.3*   < > 9.0   MAGNESIUM 2.0 2.1 2.2 2.3 2.4  --  2.9*   PHOSPHORUS  --   --   --   --   --   --  5.3*    < > = values in this interval not displayed.         Lab 01/21/24  1627   TOTAL PROTEIN 6.0   ALBUMIN 3.0*   GLOBULIN 3.0   ALT (SGPT) 28   AST (SGOT) 33   BILIRUBIN 0.4   ALK PHOS 76         Lab 01/22/24  0712 01/21/24 2008 01/21/24  1859 01/21/24  1627   PROBNP  --  5,656.0*  --   --    HSTROP T 296* 334* 358* 347*                 Brief Urine Lab Results  (Last result in the past 365 days)        Color   Clarity   Blood   Leuk Est   Nitrite   Protein   CREAT   Urine HCG        01/21/24 2201 Dark Yellow   Turbid   Large (3+)   Moderate (2+)   Negative   30 mg/dL (1+)                 Microbiology Results (last 10 days)       Procedure Component Value - Date/Time    CANDIDA AURIS SCREEN - Swab, Axilla Right, Axilla Left and Groin [[id number]]  (Normal) Collected: 01/21/24 2206    Lab Status: Final result Specimen: Swab from Axilla Right, Axilla Left and Groin Updated: 01/26/24 0742     Candida Auris Screen Culture No Candida auris isolated at 5 days    MRSA Screen, PCR (Inpatient) - Swab, Nares [[id number]]  (Normal) Collected: 01/21/24 2203    Lab  Status: Final result Specimen: Swab from Nares Updated: 01/21/24 2328     MRSA PCR No MRSA Detected    Narrative:      The negative predictive value of this diagnostic test is high and should only be used to consider de-escalating anti-MRSA therapy. A positive result may indicate colonization with MRSA and must be correlated clinically.    S. Pneumo Ag Urine or CSF - Urine, Urine, Catheter [[id number]]  (Normal) Collected: 01/21/24 2201    Lab Status: Final result Specimen: Urine, Catheter Updated: 01/21/24 2240     Strep Pneumo Ag Negative    Legionella Antigen, Urine - Urine, Urine, Catheter [[id number]]  (Normal) Collected: 01/21/24 2201    Lab Status: Final result Specimen: Urine, Catheter Updated: 01/21/24 2240     LEGIONELLA ANTIGEN, URINE Negative    Urine Culture - Urine, Urine, Catheter [[id number]] Collected: 01/21/24 2201    Lab Status: Final result Specimen: Urine, Catheter Updated: 01/23/24 0932     Urine Culture 50,000 CFU/mL Normal Urogenital Yelena    Narrative:      Colonization of the urinary tract without infection is common. Treatment is discouraged unless the patient is symptomatic, pregnant, or undergoing an invasive urologic procedure.    Respiratory Panel PCR w/COVID-19(SARS-CoV-2) KALEY/JOANN/TIMOTHY/PAD/COR/BRITNEY In-House, NP Swab in UTM/VTM, 2 HR TAT - Swab, Nasopharynx [[id number]]  (Normal) Collected: 01/21/24 1535    Lab Status: Final result Specimen: Swab from Nasopharynx Updated: 01/21/24 2233     ADENOVIRUS, PCR Not Detected     Coronavirus 229E Not Detected     Coronavirus HKU1 Not Detected     Coronavirus NL63 Not Detected     Coronavirus OC43 Not Detected     COVID19 Not Detected     Human Metapneumovirus Not Detected     Human Rhinovirus/Enterovirus Not Detected     Influenza A PCR Not Detected     Influenza B PCR Not Detected     Parainfluenza Virus 1 Not Detected     Parainfluenza Virus 2 Not Detected     Parainfluenza Virus 3 Not Detected     Parainfluenza Virus 4 Not Detected     RSV, PCR  Not Detected     Bordetella pertussis pcr Not Detected     Bordetella parapertussis PCR Not Detected     Chlamydophila pneumoniae PCR Not Detected     Mycoplasma pneumo by PCR Not Detected    Narrative:      In the setting of a positive respiratory panel with a viral infection PLUS a negative procalcitonin without other underlying concern for bacterial infection, consider observing off antibiotics or discontinuation of antibiotics and continue supportive care. If the respiratory panel is positive for atypical bacterial infection (Bordetella pertussis, Chlamydophila pneumoniae, or Mycoplasma pneumoniae), consider antibiotic de-escalation to target atypical bacterial infection.    Urine Culture - Urine, Straight Cath [[id number]]  (Abnormal) Collected: 01/21/24 1534    Lab Status: Final result Specimen: Urine from Straight Cath Updated: 01/23/24 0934     Urine Culture >100,000 CFU/mL Streptococcus, Alpha Hemolytic     Comment:   Based on laboratory diagnosis criteria, these organisms are common urogenital commensal vickie and have not been associated with urinary tract infections; clinical correlation is recommended.       Narrative:      Colonization of the urinary tract without infection is common. Treatment is discouraged unless the patient is symptomatic, pregnant, or undergoing an invasive urologic procedure.    Blood Culture - Blood, Arm, Left [[id number]]  (Normal) Collected: 01/21/24 1533    Lab Status: Preliminary result Specimen: Blood from Arm, Left Updated: 01/25/24 1545     Blood Culture No growth at 4 days    Blood Culture - Blood, Arm, Right [[id number]]  (Normal) Collected: 01/21/24 1533    Lab Status: Preliminary result Specimen: Blood from Arm, Right Updated: 01/25/24 1545     Blood Culture No growth at 4 days            CT Head Without Contrast    Result Date: 1/21/2024  Impression: Impression: 1.Global atrophy. 2.White matter changes which could reflect more chronic small vessel ischemic change  3.Evidence for previous mastoid surgery 4.Suggested cochlear implant. Electronically Signed: Salazar Flores MD  1/21/2024 6:05 PM EST  Workstation ID: [id number]    XR Chest 1 View    Result Date: 1/21/2024  Impression: Impression: 1.An acute pulmonary process is not apparent. Electronically Signed: Salazar Flores MD  1/21/2024 3:36 PM EST  Workstation ID: [id number]             Results for orders placed during the hospital encounter of 01/21/24    Adult Transthoracic Echo Limited W/ Cont if Necessary Per Protocol    Interpretation Summary    Left ventricular ejection fraction appears to be 56 - 60%.    Indications  Assess volume status.    Technically limited study.  Patient was not able to hold breath.  Mitral valve is thickened with adequate opening motion.  Tricuspid valve is structurally normal.  Aortic valve is structurally normal.  Pulmonic valve could not be well visualized.  No evidence for mitral tricuspid or aortic regurgitation is seen by Doppler study in the views obtained..  Left atrium is normal in size.  Right atrium is normal in size.  Left ventricle is normal in size and contractility with ejection fraction of 60%.  Right ventricle is normal in size.  Atrial septum-not visualized  Aorta is normal.  No pericardial effusion or intracardiac thrombus is seen.    Impression  Technically limited study.  Patient was not able to hold breath  Patient appears to be tachycardic during the exam.  Structurally and functionally normal cardiac valves.  No evidence for pulmonary hypertension is present.  Left ventricular size and contractility is normal with ejection fraction of 60%.  No pericardial effusion is seen.      Labs Pending at Discharge:  Pending Labs       Order Current Status    Blood Culture - Blood, Arm, Left Preliminary result    Blood Culture - Blood, Arm, Right Preliminary result            Procedures Performed           Consults:   Consults       Date and Time Order Name Status Description    1/22/2024   9:08 AM Inpatient Hospitalist Consult Completed     1/22/2024  5:21 AM Inpatient Palliative Care MD Consult Completed     1/21/2024  6:13 PM Intensivist (on-call MD unless specified)                Discharge Details        Discharge Medications        New Medications        Instructions Start Date   acetaminophen 650 MG suppository  Commonly known as: TYLENOL   650 mg, Rectal, Every 4 Hours PRN      HYDROmorphone 2 MG tablet  Commonly known as: Dilaudid   2 mg, Oral, Every 4 Hours PRN      ondansetron ODT 4 MG disintegrating tablet  Commonly known as: ZOFRAN-ODT   4 mg, Translingual, Every 8 Hours PRN             Continue These Medications        Instructions Start Date   citalopram 20 MG tablet  Commonly known as: CeleXA   20 mg, Oral, Daily      divalproex 125 MG DR tablet  Commonly known as: DEPAKOTE   250 mg, Oral, 2 Times Daily      docusate sodium 100 MG capsule  Commonly known as: COLACE   100 mg, Oral, 2 Times Daily      LORazepam 0.5 MG tablet  Commonly known as: ATIVAN   0.5 mg, Oral, 2 Times Daily      risperiDONE 0.25 MG tablet  Commonly known as: risperDAL   0.25 mg, Oral, Nightly      tamsulosin 0.4 MG capsule 24 hr capsule  Commonly known as: FLOMAX   1 capsule, Oral, Daily             Stop These Medications      allopurinol 300 MG tablet  Commonly known as: ZYLOPRIM     famotidine 10 MG tablet  Commonly known as: PEPCID     latanoprost 0.005 % ophthalmic solution  Commonly known as: XALATAN     losartan 100 MG tablet  Commonly known as: COZAAR     mirtazapine 15 MG tablet  Commonly known as: REMERON     traMADol 50 MG tablet  Commonly known as: ULTRAM              No Known Allergies      Discharge Disposition:   Hospice/Medical Facility (DC - External)    Diet:  Hospital:  Diet Order   Procedures    Diet: Regular/House Diet; Feeding Assistance - Nursing; Texture: Pureed (NDD 1); Fluid Consistency: Thin (IDDSI 0)         Discharge Activity:   as tolerated       CODE STATUS:  Code Status and Medical  Interventions:   Ordered at: 01/21/24 2133     Medical Intervention Limits:    NO intubation (DNI)     Code Status (Patient has no pulse and is not breathing):    No CPR (Do Not Attempt to Resuscitate)     Medical Interventions (Patient has pulse or is breathing):    Limited Support     Comments:    Discussed with patient, wife, daughter, and son         No future appointments.        Time spent on Discharge including face to face service:  >30 minutes    Signature: Electronically signed by Yasir Allen MD, 01/26/24, 08:39 EST.  [facility] Hospitalist Team

## 2024-01-26 NOTE — CASE MANAGEMENT/SOCIAL WORK
"Physicians Statement of Medical Necessity for  Ambulance Transportation    GENERAL INFORMATION     Name: Cookie Larsen  YOB: 1935  Medicare #:     [id number]     Transport Date: 1/26/24  (Valid for round trips this date, or for scheduled repetitive trips for 60 days from the date signed below.)  Origin: [place]  Destination: [facility]   Is the Patient's stay covered under Medicare Part A (PPS/DRG?)Yes  Closest appropriate facility? Yes  If this a hosp-hosp transfer? No  Is this a hospice patient? No    MEDICAL NECESSITY QUESTIONAIRE    Ambulance Transportation is medically necessary only if other means of transportation are contraindicated or would be potentially harmful to the patient.  To meet this requirement, the patient must be either \"bed confined\" or suffer from a condition such that transport by means other than an ambulance is contraindicated by the patient's condition.  The following questions must be answered by the healthcare professional signing below for this form to be valid:     1) Describe the MEDICAL CONDITION (physical and/or mental) of this patient AT THE TIME OF AMBULANCE TRANSPORT that requires the patient to be transported in an ambulance, and why transport by other means is contraindicated by the patient's condition: metabolic encephalopathy, confusion.   Bedbound at [facility].  Stage 1 heel, stage 2 buttock.  2L O2  Past Medical History:   Diagnosis Date    Depression     GONZALES (dyspnea on exertion)     Glaucoma     Gout     Hypertension     IFG (impaired fasting glucose)     NICM (nonischemic cardiomyopathy)     Vitamin D deficiency       Past Surgical History:   Procedure Laterality Date    CARDIAC CATHETERIZATION N/A 3/13/2019    Procedure: Coronary angiography;  Surgeon: Jovita Crews MD;  Location: [facility] INVASIVE LOCATION;  Service: Cardiology    CARDIAC CATHETERIZATION N/A 3/13/2019    Procedure: Left Heart Cath;  Surgeon: Jovita Crews MD;  " "Location: [facility] CATH INVASIVE LOCATION;  Service: Cardiology    CARDIAC CATHETERIZATION N/A 3/13/2019    Procedure: Left ventriculography;  Surgeon: Jovita Crews MD;  Location: [facility] CATH INVASIVE LOCATION;  Service: Cardiology    EAR MASTOIDECTOMY W/ COCHLEAR IMPLANT W/ LANDMARK Right     HEMORRHOIDECTOMY        2) Is this patient \"bed confined\" as defined below?Yes   To be \"bed confined\" the patient must satisfy all three of the following criteria:  (1) unable to get up from bed without assistance; AND (2) unable to ambulate;  AND (3) unable to sit in a chair or wheelchair.  3) Can this patient safely be transported by car or wheelchair van (I.e., may safely sit during transport, without an attendant or monitoring?)No   4. In addition to completing questions 1-3 above, please check any of the following conditions that apply*:          *Note: supporting documentation for any boxes checked must be maintained in the patient's medical records Moderate/severe pain on movement, Medical attendant required, and Unable to sit in a chair or wheelchair due to decubitus ulcers or other wounds      SIGNATURE OF PHYSICIAN OR OTHER AUTHORIZED HEALTHCARE PROFESSIONAL    I certify that the above information is true and correct based on my evaluation of this patient, and represent that the patient requires transport by ambulance and that other forms of transport are contraindicated.  I understand that this information will be used by the Centers for Medicare and Medicaid Services (CMS) to support the determiniation of medical necessity for ambulance services, and I represent that I have personal knowledge of the patient's condition at the time of transport.    x   If this box is checked, I also certify that the patient is physically or mentally incapable of signing the ambulance service's claim form and that the institution with which I am affiliated has furnished care, services or assistance to the patient.  My signature below " is made on behalf of the patient pursuant to 42 .36(b)(4). In accordance with 42 .37, the specific reason(s) that the patient is physically or mentally incapable of signing the claim for is as follows: confusion    Signature of Physician or Healthcare Professional   Keyla Pierre RN Date/Time:   1/26/24 1050     (For Scheduled repetitive transport, this form is not valid for transports performed more than 60 days after this date).                                                                                                                                            Keyla Pierre RN/ Yasir Allen MD. --------------------------------------------------------------------------------------------  Printed Name and Credentials of Physician or Authorized Healthcare Professional     *Form must be signed by patient's attending physician for scheduled, repetitive transports,.  For non-repetitive ambulance transports, if unable to obtain the signature of the attending physician, any of the following may sign (please select below):     Physician  Clinical Nurse Specialist  Registered Nurse x    Physician Assistant  Discharge Planner  Licensed Practical Nurse     Nurse Practitioner   x

## 2024-01-26 NOTE — PLAN OF CARE
Problem: Adult Inpatient Plan of Care  Goal: Plan of Care Review  Flowsheets (Taken 1/26/2024 0612)  Progress: no change  Plan of Care Reviewed With: patient   Goal Outcome Evaluation:  Plan of Care Reviewed With: patient        Progress: no change

## 2024-01-26 NOTE — PROGRESS NOTES
Enter Query Response Below      Query Response: Severe sepsis with septic shock              If applicable, please update the problem list.       Patient: Cookie Larsen        : 1935  Account: [id number]           Admit Date:         How to Respond to this query:       a. Click New Note     b. Answer query within the yellow box.                c. Update the Problem List, if applicable.      If you have any questions about this query contact me at: [email or web address]     Dr. Allen:     Emergency room provider note states from nursing home with altered mental status, hx of dementia but declining last couple weeks, sleeping more, exam: eyes closed, not following command, no verbal response, temperature 95.7, heart rate 104, bp 72/46, creatinine 4.21, wbc 13.00, lactate 3.9, septic shock, urinary tract infection, acute renal failure.  In emergency room received iv fluid bolus, admitted to ICU. History and physical states severe sepsis, d5w at 100cc/hr, cultures, cefepime iv.  Nursing note states patient has received 50g albumin and 2L LR due to soft pressures. Discharge summary states severe sepsis present on admission, prolonged hospital course initially admitted to the ICU requiring pressors as well as given antibiotics.     After study can severe sepsis be further specified as:    Severe sepsis with septic shock  Severe sepsis without septic shock  Other___________________.  Unable to determine.     By submitting this query, we are merely seeking further clarification of documentation to accurately reflect all conditions that you are monitoring, evaluating, treating or that extend the hospitalization or utilize additional resources of care. Please utilize your independent clinical judgment when addressing the question(s) above.     This query and your response, once completed, will be entered into the legal medical record.    Sincerely,  Tawanna QUIÑONES RN BSN   Clinical Documentation Integrity Program    [email or web address]

## (undated) DEVICE — PK CATH CARD 40

## (undated) DEVICE — CATH VENT MIV RADL PIG ST TIP 5F 110CM

## (undated) DEVICE — GLIDESHEATH SLENDER STAINLESS STEEL KIT: Brand: GLIDESHEATH SLENDER

## (undated) DEVICE — CATH DIAG SITESEER 4FAL I

## (undated) DEVICE — RADIFOCUS GLIDEWIRE: Brand: GLIDEWIRE

## (undated) DEVICE — CATH F5 INF 3DRC 100CM: Brand: INFINITI

## (undated) DEVICE — CATH DIAG IMPULSE AL1 5F 100CM

## (undated) DEVICE — KT MANIFLD CARDIAC

## (undated) DEVICE — CATH DIAG IMPULSE AR2 5F 100CM

## (undated) DEVICE — GW EMR FIX EXCHG J STD .035 3MM 260CM

## (undated) DEVICE — CATH DIAG IMPULSE FL3.5 5F 100CM

## (undated) DEVICE — CATH DIAG IMPULSE FR4 5F 100CM